# Patient Record
Sex: MALE | Race: OTHER | Employment: FULL TIME | URBAN - METROPOLITAN AREA
[De-identification: names, ages, dates, MRNs, and addresses within clinical notes are randomized per-mention and may not be internally consistent; named-entity substitution may affect disease eponyms.]

---

## 2019-04-15 ENCOUNTER — APPOINTMENT (OUTPATIENT)
Dept: CT IMAGING | Age: 39
DRG: 247 | End: 2019-04-15
Attending: PHYSICIAN ASSISTANT
Payer: COMMERCIAL

## 2019-04-15 ENCOUNTER — HOSPITAL ENCOUNTER (INPATIENT)
Age: 39
LOS: 2 days | Discharge: HOME | DRG: 247 | End: 2019-04-17
Attending: EMERGENCY MEDICINE | Admitting: INTERNAL MEDICINE
Payer: COMMERCIAL

## 2019-04-15 DIAGNOSIS — I21.4 NSTEMI (NON-ST ELEVATED MYOCARDIAL INFARCTION) (HCC): Primary | ICD-10-CM

## 2019-04-15 PROBLEM — D72.829 LEUKOCYTOSIS: Status: ACTIVE | Noted: 2019-04-15

## 2019-04-15 PROBLEM — Z72.0 TOBACCO ABUSE: Chronic | Status: ACTIVE | Noted: 2019-04-15

## 2019-04-15 PROBLEM — E78.00 HIGH CHOLESTEROL: Chronic | Status: ACTIVE | Noted: 2019-04-15

## 2019-04-15 LAB
ALBUMIN SERPL-MCNC: 4.3 G/DL (ref 3.5–5)
ALBUMIN/GLOB SERPL: 1.2 {RATIO} (ref 1.1–2.2)
ALP SERPL-CCNC: 62 U/L (ref 45–117)
ALT SERPL-CCNC: 57 U/L (ref 12–78)
ANION GAP SERPL CALC-SCNC: 6 MMOL/L (ref 5–15)
APTT PPP: 27.7 SEC (ref 22.1–32)
AST SERPL-CCNC: 53 U/L (ref 15–37)
BASOPHILS # BLD: 0.1 K/UL (ref 0–0.1)
BASOPHILS NFR BLD: 1 % (ref 0–1)
BILIRUB SERPL-MCNC: 0.4 MG/DL (ref 0.2–1)
BNP SERPL-MCNC: 363 PG/ML
BUN SERPL-MCNC: 10 MG/DL (ref 6–20)
BUN/CREAT SERPL: 10 (ref 12–20)
CALCIUM SERPL-MCNC: 8.8 MG/DL (ref 8.5–10.1)
CHLORIDE SERPL-SCNC: 103 MMOL/L (ref 97–108)
CO2 SERPL-SCNC: 28 MMOL/L (ref 21–32)
COMMENT, HOLDF: NORMAL
CREAT SERPL-MCNC: 1 MG/DL (ref 0.7–1.3)
DIFFERENTIAL METHOD BLD: ABNORMAL
EOSINOPHIL # BLD: 0.4 K/UL (ref 0–0.4)
EOSINOPHIL NFR BLD: 3 % (ref 0–7)
ERYTHROCYTE [DISTWIDTH] IN BLOOD BY AUTOMATED COUNT: 12.2 % (ref 11.5–14.5)
GLOBULIN SER CALC-MCNC: 3.5 G/DL (ref 2–4)
GLUCOSE SERPL-MCNC: 118 MG/DL (ref 65–100)
HCT VFR BLD AUTO: 46.8 % (ref 36.6–50.3)
HGB BLD-MCNC: 15.5 G/DL (ref 12.1–17)
IMM GRANULOCYTES # BLD AUTO: 0 K/UL (ref 0–0.04)
IMM GRANULOCYTES NFR BLD AUTO: 0 % (ref 0–0.5)
LYMPHOCYTES # BLD: 2.7 K/UL (ref 0.8–3.5)
LYMPHOCYTES NFR BLD: 21 % (ref 12–49)
MCH RBC QN AUTO: 30.7 PG (ref 26–34)
MCHC RBC AUTO-ENTMCNC: 33.1 G/DL (ref 30–36.5)
MCV RBC AUTO: 92.7 FL (ref 80–99)
MONOCYTES # BLD: 0.8 K/UL (ref 0–1)
MONOCYTES NFR BLD: 6 % (ref 5–13)
NEUTS SEG # BLD: 9 K/UL (ref 1.8–8)
NEUTS SEG NFR BLD: 69 % (ref 32–75)
NRBC # BLD: 0 K/UL (ref 0–0.01)
NRBC BLD-RTO: 0 PER 100 WBC
PLATELET # BLD AUTO: 276 K/UL (ref 150–400)
PMV BLD AUTO: 9.3 FL (ref 8.9–12.9)
POTASSIUM SERPL-SCNC: 3.6 MMOL/L (ref 3.5–5.1)
PROT SERPL-MCNC: 7.8 G/DL (ref 6.4–8.2)
RBC # BLD AUTO: 5.05 M/UL (ref 4.1–5.7)
SAMPLES BEING HELD,HOLD: NORMAL
SODIUM SERPL-SCNC: 137 MMOL/L (ref 136–145)
THERAPEUTIC RANGE,PTTT: NORMAL SECS (ref 58–77)
TROPONIN I BLD-MCNC: 1.97 NG/ML (ref 0–0.08)
WBC # BLD AUTO: 12.8 K/UL (ref 4.1–11.1)

## 2019-04-15 PROCEDURE — 84484 ASSAY OF TROPONIN QUANT: CPT

## 2019-04-15 PROCEDURE — 36415 COLL VENOUS BLD VENIPUNCTURE: CPT

## 2019-04-15 PROCEDURE — 83880 ASSAY OF NATRIURETIC PEPTIDE: CPT

## 2019-04-15 PROCEDURE — 82550 ASSAY OF CK (CPK): CPT

## 2019-04-15 PROCEDURE — 74011636320 HC RX REV CODE- 636/320: Performed by: EMERGENCY MEDICINE

## 2019-04-15 PROCEDURE — 85025 COMPLETE CBC W/AUTO DIFF WBC: CPT

## 2019-04-15 PROCEDURE — 93005 ELECTROCARDIOGRAM TRACING: CPT

## 2019-04-15 PROCEDURE — 71275 CT ANGIOGRAPHY CHEST: CPT

## 2019-04-15 PROCEDURE — 65610000006 HC RM INTENSIVE CARE

## 2019-04-15 PROCEDURE — 74011250636 HC RX REV CODE- 250/636: Performed by: PHYSICIAN ASSISTANT

## 2019-04-15 PROCEDURE — 80053 COMPREHEN METABOLIC PANEL: CPT

## 2019-04-15 PROCEDURE — 74011250637 HC RX REV CODE- 250/637: Performed by: INTERNAL MEDICINE

## 2019-04-15 PROCEDURE — 85730 THROMBOPLASTIN TIME PARTIAL: CPT

## 2019-04-15 PROCEDURE — 99285 EMERGENCY DEPT VISIT HI MDM: CPT

## 2019-04-15 RX ORDER — HYDROMORPHONE HYDROCHLORIDE 2 MG/ML
0.5 INJECTION, SOLUTION INTRAMUSCULAR; INTRAVENOUS; SUBCUTANEOUS
Status: DISCONTINUED | OUTPATIENT
Start: 2019-04-15 | End: 2019-04-17 | Stop reason: HOSPADM

## 2019-04-15 RX ORDER — PROCHLORPERAZINE EDISYLATE 5 MG/ML
10 INJECTION INTRAMUSCULAR; INTRAVENOUS
Status: DISCONTINUED | OUTPATIENT
Start: 2019-04-15 | End: 2019-04-17 | Stop reason: HOSPADM

## 2019-04-15 RX ORDER — ZOLPIDEM TARTRATE 5 MG/1
5 TABLET ORAL
Status: DISCONTINUED | OUTPATIENT
Start: 2019-04-15 | End: 2019-04-17 | Stop reason: HOSPADM

## 2019-04-15 RX ORDER — ACETAMINOPHEN 325 MG/1
325 TABLET ORAL
COMMUNITY
End: 2022-04-22

## 2019-04-15 RX ORDER — PRAVASTATIN SODIUM 20 MG/1
20 TABLET ORAL
Status: DISCONTINUED | OUTPATIENT
Start: 2019-04-16 | End: 2019-04-16

## 2019-04-15 RX ORDER — ACETAMINOPHEN 325 MG/1
650 TABLET ORAL
Status: DISCONTINUED | OUTPATIENT
Start: 2019-04-15 | End: 2019-04-17 | Stop reason: HOSPADM

## 2019-04-15 RX ORDER — SODIUM CHLORIDE 0.9 % (FLUSH) 0.9 %
5-40 SYRINGE (ML) INJECTION EVERY 8 HOURS
Status: DISCONTINUED | OUTPATIENT
Start: 2019-04-15 | End: 2019-04-17 | Stop reason: HOSPADM

## 2019-04-15 RX ORDER — LEVOTHYROXINE SODIUM 125 UG/1
150 TABLET ORAL
COMMUNITY
End: 2022-04-22 | Stop reason: SDUPTHER

## 2019-04-15 RX ORDER — LEVOTHYROXINE SODIUM 125 UG/1
125 TABLET ORAL
Status: DISCONTINUED | OUTPATIENT
Start: 2019-04-16 | End: 2019-04-17 | Stop reason: HOSPADM

## 2019-04-15 RX ORDER — SODIUM CHLORIDE 9 MG/ML
75 INJECTION, SOLUTION INTRAVENOUS CONTINUOUS
Status: DISCONTINUED | OUTPATIENT
Start: 2019-04-15 | End: 2019-04-16

## 2019-04-15 RX ORDER — PRAVASTATIN SODIUM 20 MG/1
20 TABLET ORAL
COMMUNITY
End: 2019-04-17

## 2019-04-15 RX ORDER — HEPARIN SODIUM 10000 [USP'U]/100ML
11-25 INJECTION, SOLUTION INTRAVENOUS
Status: DISCONTINUED | OUTPATIENT
Start: 2019-04-15 | End: 2019-04-16

## 2019-04-15 RX ORDER — OXYCODONE AND ACETAMINOPHEN 5; 325 MG/1; MG/1
1 TABLET ORAL
Status: DISCONTINUED | OUTPATIENT
Start: 2019-04-15 | End: 2019-04-17 | Stop reason: HOSPADM

## 2019-04-15 RX ORDER — SODIUM CHLORIDE 0.9 % (FLUSH) 0.9 %
5-40 SYRINGE (ML) INJECTION AS NEEDED
Status: DISCONTINUED | OUTPATIENT
Start: 2019-04-15 | End: 2019-04-17 | Stop reason: HOSPADM

## 2019-04-15 RX ORDER — HEPARIN SODIUM 5000 [USP'U]/ML
4000 INJECTION, SOLUTION INTRAVENOUS; SUBCUTANEOUS ONCE
Status: COMPLETED | OUTPATIENT
Start: 2019-04-15 | End: 2019-04-15

## 2019-04-15 RX ADMIN — ZOLPIDEM TARTRATE 5 MG: 5 TABLET ORAL at 23:19

## 2019-04-15 RX ADMIN — Medication 10 ML: at 23:21

## 2019-04-15 RX ADMIN — HEPARIN SODIUM 4000 UNITS: 5000 INJECTION INTRAVENOUS; SUBCUTANEOUS at 21:36

## 2019-04-15 RX ADMIN — HEPARIN SODIUM 11 UNITS/KG/HR: 10000 INJECTION, SOLUTION INTRAVENOUS at 21:39

## 2019-04-15 RX ADMIN — IOPAMIDOL 80 ML: 755 INJECTION, SOLUTION INTRAVENOUS at 19:39

## 2019-04-15 NOTE — Clinical Note
TRANSFER - OUT REPORT:  
 
Verbal report given to: olan. Report consisted of patient's Situation, Background, Assessment and  
Recommendations(SBAR). Opportunity for questions and clarification was provided. Patient transported with a Registered Nurse. TRANSFER - OUT REPORT: 
 
Verbal report given to ragini cates on Tim Zhong  being transferred to [facility] for routine progression of care Report consisted of patients Situation, Background, Assessment and  
Recommendations(SBAR). Information from the following report(s) Procedure Summary, Intake/Output, MAR and Recent Results was reviewed with the receiving nurse. Lines: @LDA(4,5,6,7,8,12)@ Opportunity for questions and clarification was provided. Patient transported with: 
 Monitor Registered Nurse

## 2019-04-15 NOTE — ED TRIAGE NOTES
Pt arrived via EMS from [facility] for CP that started yesterday after eating lunch. EKG WNL per EMS and [facility]. Worst pain was 8/10, 1 SL NTG given by [facility], pain improved down to a 4/10. Pt taken directly to Er 22 for EKG.

## 2019-04-15 NOTE — Clinical Note
Lesion: Located in the Proximal Diag 2. Stent deployed. Single technique used. First inflation pressure = 12 mandy; inflation time: 31 sec.

## 2019-04-15 NOTE — Clinical Note
TRANSFER - IN REPORT:  
 
Verbal report received from: bedside. Report consisted of patient's Situation, Background, Assessment and  
Recommendations(SBAR). Opportunity for questions and clarification was provided. Assessment completed upon patient's arrival to unit and care assumed. Patient transported with a Registered Nurse.

## 2019-04-15 NOTE — Clinical Note
Lesion located in the Proximal Diag 2. Balloon inserted. Balloon inflated using single inflation technique. Pressure = 8 mandy; Duration = 29 sec.

## 2019-04-15 NOTE — ED PROVIDER NOTES
Cherelle Patel is a 45 y.o. male  who presents by EMS to ER with c/o Patient presents with:  Chest Pain. Patient reports substernal chest pain that started yesterday after eating beans and chickpeas for lunch. Patient reports pain was originally intermittent and now is constant. Patient tried zantac with mild improvement. Patient seen at [facility] today and had elevated troponin and D dimer. Patient was sent to ED by EMS after getting sublingual nitro with some relief of pain. Patient denies any associated symptoms, denies any alleviating or aggravating symptoms. Denies shortness of breath. Patient reports history of hypothyroidism and hyperlipidemia. He specifically denies any fevers, chills, nausea, vomiting, shortness of breath, headache, rash, diarrhea, abdominal pain, urinary/bowel changes, sweating or weight loss. PCP: Jackie Villalpando MD   PMHx significant for: No past medical history on file. PSHx significant for: No past surgical history on file. Social Hx: Tobacco use: Social History    Tobacco Use      Smoking status: Not on file  ; EtOH use: The patient states he drinks 0 per week.; Illicit Drug use: Allergies:   -- Aspirin -- Hives    There are no other complaints, changes or physical findings at this time. No past medical history on file. No past surgical history on file. No family history on file.     Social History     Socioeconomic History    Marital status:      Spouse name: Not on file    Number of children: Not on file    Years of education: Not on file    Highest education level: Not on file   Occupational History    Not on file   Social Needs    Financial resource strain: Not on file    Food insecurity:     Worry: Not on file     Inability: Not on file    Transportation needs:     Medical: Not on file     Non-medical: Not on file   Tobacco Use    Smoking status: Not on file   Substance and Sexual Activity    Alcohol use: Not on file    Drug use: Not on file    Sexual activity: Not on file   Lifestyle    Physical activity:     Days per week: Not on file     Minutes per session: Not on file    Stress: Not on file   Relationships    Social connections:     Talks on phone: Not on file     Gets together: Not on file     Attends Cheondoism service: Not on file     Active member of club or organization: Not on file     Attends meetings of clubs or organizations: Not on file     Relationship status: Not on file    Intimate partner violence:     Fear of current or ex partner: Not on file     Emotionally abused: Not on file     Physically abused: Not on file     Forced sexual activity: Not on file   Other Topics Concern    Not on file   Social History Narrative    Not on file         ALLERGIES: Aspirin    Review of Systems   Constitutional: Negative for activity change, appetite change, chills and fever. HENT: Negative for congestion and sore throat. Respiratory: Negative for cough and shortness of breath. Cardiovascular: Positive for chest pain. Gastrointestinal: Negative for abdominal pain, diarrhea, nausea and vomiting. Genitourinary: Negative for dysuria. Musculoskeletal: Negative for arthralgias and myalgias. Skin: Negative for color change. Neurological: Negative for dizziness. Psychiatric/Behavioral: The patient is not nervous/anxious. All other systems reviewed and are negative. There were no vitals filed for this visit. Physical Exam   Constitutional: He is oriented to person, place, and time. He appears well-developed and well-nourished. HENT:   Head: Normocephalic and atraumatic. Right Ear: External ear normal.   Left Ear: External ear normal.   Mouth/Throat: Oropharynx is clear and moist. No oropharyngeal exudate. Eyes: Pupils are equal, round, and reactive to light. Conjunctivae and EOM are normal. Right eye exhibits no discharge. Left eye exhibits no discharge. No scleral icterus.    Neck: Normal range of motion. Neck supple. No tracheal deviation present. No thyromegaly present. Cardiovascular: Normal rate, regular rhythm, normal heart sounds and intact distal pulses. No murmur heard. Pulmonary/Chest: Effort normal and breath sounds normal. No respiratory distress. He has no wheezes. He has no rales. Abdominal: Soft. Bowel sounds are normal. He exhibits no distension. There is no tenderness. There is no rebound and no guarding. Musculoskeletal: Normal range of motion. He exhibits no edema or tenderness. Lymphadenopathy:     He has no cervical adenopathy. Neurological: He is alert and oriented to person, place, and time. No cranial nerve deficit. Coordination normal.   Skin: Skin is warm. No rash noted. No erythema. Psychiatric: He has a normal mood and affect. His behavior is normal. Judgment and thought content normal.   Nursing note and vitals reviewed. MDM       Procedures      CONSULT NOTE:   8:15 PM  Patricia Dakins PA-C spoke with Dr. Unique Baltazar,   Specialty: cardiology  Discussed pt's hx, disposition, and available diagnostic and imaging results. Reviewed care plans. Consultant agrees with plans as outlined. Recommended heparin and will do catheterization in the morning. CONSULT NOTE:   8:27 PM  Patricia Dakins PA-C spoke with Dr. Dipak Archibald,   Specialty: Hospitalist  Discussed pt's hx, disposition, and available diagnostic and imaging results. Reviewed care plans. Consultant agrees with plans as outlined. Will see for admission. Patient is being admitted to the hospital.  The results of their tests and reasons for their admission have been discussed with them and/or available family. They convey agreement and understanding for the need to be admitted and for their admission diagnosis. Consultation has been made with the inpatient physician specialist for hospitalization.     LABORATORY TESTS:  Recent Results (from the past 12 hour(s))   SAMPLES BEING HELD    Collection Time: 04/15/19  6:27 PM   Result Value Ref Range    SAMPLES BEING HELD red,blue,gold     COMMENT        Add-on orders for these samples will be processed based on acceptable specimen integrity and analyte stability, which may vary by analyte. CBC WITH AUTOMATED DIFF    Collection Time: 04/15/19  6:27 PM   Result Value Ref Range    WBC 12.8 (H) 4.1 - 11.1 K/uL    RBC 5.05 4. 10 - 5.70 M/uL    HGB 15.5 12.1 - 17.0 g/dL    HCT 46.8 36.6 - 50.3 %    MCV 92.7 80.0 - 99.0 FL    MCH 30.7 26.0 - 34.0 PG    MCHC 33.1 30.0 - 36.5 g/dL    RDW 12.2 11.5 - 14.5 %    PLATELET 270 749 - 680 K/uL    MPV 9.3 8.9 - 12.9 FL    NRBC 0.0 0  WBC    ABSOLUTE NRBC 0.00 0.00 - 0.01 K/uL    NEUTROPHILS 69 32 - 75 %    LYMPHOCYTES 21 12 - 49 %    MONOCYTES 6 5 - 13 %    EOSINOPHILS 3 0 - 7 %    BASOPHILS 1 0 - 1 %    IMMATURE GRANULOCYTES 0 0.0 - 0.5 %    ABS. NEUTROPHILS 9.0 (H) 1.8 - 8.0 K/UL    ABS. LYMPHOCYTES 2.7 0.8 - 3.5 K/UL    ABS. MONOCYTES 0.8 0.0 - 1.0 K/UL    ABS. EOSINOPHILS 0.4 0.0 - 0.4 K/UL    ABS. BASOPHILS 0.1 0.0 - 0.1 K/UL    ABS. IMM. GRANS. 0.0 0.00 - 0.04 K/UL    DF AUTOMATED     METABOLIC PANEL, COMPREHENSIVE    Collection Time: 04/15/19  6:27 PM   Result Value Ref Range    Sodium 137 136 - 145 mmol/L    Potassium 3.6 3.5 - 5.1 mmol/L    Chloride 103 97 - 108 mmol/L    CO2 28 21 - 32 mmol/L    Anion gap 6 5 - 15 mmol/L    Glucose 118 (H) 65 - 100 mg/dL    BUN 10 6 - 20 MG/DL    Creatinine 1.00 0.70 - 1.30 MG/DL    BUN/Creatinine ratio 10 (L) 12 - 20      GFR est AA >60 >60 ml/min/1.73m2    GFR est non-AA >60 >60 ml/min/1.73m2    Calcium 8.8 8.5 - 10.1 MG/DL    Bilirubin, total 0.4 0.2 - 1.0 MG/DL    ALT (SGPT) 57 12 - 78 U/L    AST (SGOT) 53 (H) 15 - 37 U/L    Alk.  phosphatase 62 45 - 117 U/L    Protein, total 7.8 6.4 - 8.2 g/dL    Albumin 4.3 3.5 - 5.0 g/dL    Globulin 3.5 2.0 - 4.0 g/dL    A-G Ratio 1.2 1.1 - 2.2     NT-PRO BNP    Collection Time: 04/15/19  6:27 PM   Result Value Ref Range    NT pro- (H) <125 PG/ML   POC TROPONIN-I    Collection Time: 04/15/19  6:32 PM   Result Value Ref Range    Troponin-I (POC) 1.97 (H) 0.00 - 0.08 ng/mL   PTT    Collection Time: 04/15/19  8:59 PM   Result Value Ref Range    aPTT 27.7 22.1 - 32.0 sec    aPTT, therapeutic range     58.0 - 77.0 SECS       IMAGING RESULTS:  See chart    MEDICATIONS GIVEN:  Medications   heparin 25,000 units in D5W 250 ml infusion (11 Units/kg/hr × 95.7 kg IntraVENous New Bag 4/15/19 2139)   0.9% sodium chloride infusion (has no administration in time range)   sodium chloride (NS) flush 5-40 mL (10 mL IntraVENous Given 4/15/19 2321)   sodium chloride (NS) flush 5-40 mL (has no administration in time range)   acetaminophen (TYLENOL) tablet 650 mg (has no administration in time range)   oxyCODONE-acetaminophen (PERCOCET) 5-325 mg per tablet 1 Tab (has no administration in time range)   HYDROmorphone (PF) (DILAUDID) injection 0.5 mg (has no administration in time range)   prochlorperazine (COMPAZINE) injection 10 mg (has no administration in time range)   zolpidem (AMBIEN) tablet 5 mg (5 mg Oral Given 4/15/19 2319)   levothyroxine (SYNTHROID) tablet 125 mcg (has no administration in time range)   pravastatin (PRAVACHOL) tablet 20 mg (has no administration in time range)   iopamidol (ISOVUE-370) 76 % injection 100 mL (80 mL IntraVENous Given 4/15/19 1939)   heparin (porcine) injection 4,000 Units (4,000 Units IntraVENous Given 4/15/19 2136)       IMPRESSION:  1. NSTEMI (non-ST elevated myocardial infarction) ([facility] [address]        PLAN:  1.  Admit to hospital

## 2019-04-15 NOTE — Clinical Note
Lesion located in the Proximal Diag 2. Balloon inflated using single inflation technique. Pressure = 10 mandy; Duration = 29 sec.

## 2019-04-15 NOTE — Clinical Note
Single view of the left main, left coronary artery, LAD and circumflex artery obtained using hand injection.

## 2019-04-16 ENCOUNTER — APPOINTMENT (OUTPATIENT)
Dept: NON INVASIVE DIAGNOSTICS | Age: 39
DRG: 247 | End: 2019-04-16
Attending: INTERNAL MEDICINE
Payer: COMMERCIAL

## 2019-04-16 ENCOUNTER — HOME HEALTH ADMISSION (OUTPATIENT)
Dept: HOME HEALTH SERVICES | Facility: HOME HEALTH | Age: 39
End: 2019-04-16

## 2019-04-16 LAB
ACT BLD: 373 SECS (ref 79–138)
ANION GAP SERPL CALC-SCNC: 6 MMOL/L (ref 5–15)
APTT PPP: 42.3 SEC (ref 22.1–32)
ATRIAL RATE: 61 BPM
ATRIAL RATE: 67 BPM
BUN SERPL-MCNC: 8 MG/DL (ref 6–20)
BUN/CREAT SERPL: 10 (ref 12–20)
CALCIUM SERPL-MCNC: 8.9 MG/DL (ref 8.5–10.1)
CALCULATED P AXIS, ECG09: 39 DEGREES
CALCULATED P AXIS, ECG09: 41 DEGREES
CALCULATED R AXIS, ECG10: 81 DEGREES
CALCULATED R AXIS, ECG10: 89 DEGREES
CALCULATED T AXIS, ECG11: 62 DEGREES
CALCULATED T AXIS, ECG11: 79 DEGREES
CHLORIDE SERPL-SCNC: 103 MMOL/L (ref 97–108)
CHOLEST SERPL-MCNC: 217 MG/DL
CK SERPL-CCNC: 338 U/L (ref 39–308)
CK SERPL-CCNC: 368 U/L (ref 39–308)
CO2 SERPL-SCNC: 25 MMOL/L (ref 21–32)
CREAT SERPL-MCNC: 0.84 MG/DL (ref 0.7–1.3)
DIAGNOSIS, 93000: NORMAL
DIAGNOSIS, 93000: NORMAL
ECHO AO ASC DIAM: 3.33 CM
ECHO AO ROOT DIAM: 3.75 CM
ECHO LA AREA 4C: 13.2 CM2
ECHO LA MAJOR AXIS: 3.76 CM
ECHO LA TO AORTIC ROOT RATIO: 1
ECHO LA VOL 2C: 36.67 ML (ref 18–58)
ECHO LA VOL 4C: 29.11 ML (ref 18–58)
ECHO LA VOL BP: 33.6 ML (ref 18–58)
ECHO LA VOL/BSA BIPLANE: 15.45 ML/M2 (ref 16–28)
ECHO LA VOLUME INDEX A2C: 16.86 ML/M2 (ref 16–28)
ECHO LA VOLUME INDEX A4C: 13.39 ML/M2 (ref 16–28)
ECHO LV E' LATERAL VELOCITY: 10.43 CENTIMETER/SECOND
ECHO LV E' SEPTAL VELOCITY: 6.92 CENTIMETER/SECOND
ECHO LV INTERNAL DIMENSION DIASTOLIC: 4.45 CM (ref 4.2–5.9)
ECHO LV INTERNAL DIMENSION SYSTOLIC: 2.84 CM
ECHO LV IVSD: 0.73 CM (ref 0.6–1)
ECHO LV MASS 2D: 111.1 G (ref 88–224)
ECHO LV MASS INDEX 2D: 45.8 G/M2 (ref 49–115)
ECHO LV POSTERIOR WALL DIASTOLIC: 0.74 CM (ref 0.6–1)
ECHO LVOT DIAM: 2.24 CM
ECHO MV A VELOCITY: 42.71 CM/S
ECHO MV AREA PHT: 3.6 CM2
ECHO MV E DECELERATION TIME (DT): 208 MS
ECHO MV E VELOCITY: 47.97 CM/S
ECHO MV E/A RATIO: 1.1
ECHO MV PRESSURE HALF TIME (PHT): 60.3 MS
ECHO RV INTERNAL DIMENSION: 2.84 CM
ECHO RV TAPSE: 1.72 CM (ref 1.5–2)
ERYTHROCYTE [DISTWIDTH] IN BLOOD BY AUTOMATED COUNT: 12.4 % (ref 11.5–14.5)
EST. AVERAGE GLUCOSE BLD GHB EST-MCNC: 137 MG/DL
GLUCOSE SERPL-MCNC: 198 MG/DL (ref 65–100)
HBA1C MFR BLD: 6.4 % (ref 4.2–6.3)
HCT VFR BLD AUTO: 47.8 % (ref 36.6–50.3)
HDLC SERPL-MCNC: 37 MG/DL
HDLC SERPL: 5.9 {RATIO} (ref 0–5)
HGB BLD-MCNC: 15.7 G/DL (ref 12.1–17)
LDLC SERPL CALC-MCNC: 131.6 MG/DL (ref 0–100)
LIPID PROFILE,FLP: ABNORMAL
MAGNESIUM SERPL-MCNC: 2.1 MG/DL (ref 1.6–2.4)
MCH RBC QN AUTO: 30 PG (ref 26–34)
MCHC RBC AUTO-ENTMCNC: 32.8 G/DL (ref 30–36.5)
MCV RBC AUTO: 91.4 FL (ref 80–99)
NRBC # BLD: 0 K/UL (ref 0–0.01)
NRBC BLD-RTO: 0 PER 100 WBC
P-R INTERVAL, ECG05: 142 MS
P-R INTERVAL, ECG05: 146 MS
PHOSPHATE SERPL-MCNC: 3.9 MG/DL (ref 2.6–4.7)
PLATELET # BLD AUTO: 281 K/UL (ref 150–400)
PMV BLD AUTO: 9.6 FL (ref 8.9–12.9)
POTASSIUM SERPL-SCNC: 3.8 MMOL/L (ref 3.5–5.1)
Q-T INTERVAL, ECG07: 404 MS
Q-T INTERVAL, ECG07: 422 MS
QRS DURATION, ECG06: 102 MS
QRS DURATION, ECG06: 106 MS
QTC CALCULATION (BEZET), ECG08: 406 MS
QTC CALCULATION (BEZET), ECG08: 445 MS
RBC # BLD AUTO: 5.23 M/UL (ref 4.1–5.7)
SODIUM SERPL-SCNC: 134 MMOL/L (ref 136–145)
THERAPEUTIC RANGE,PTTT: ABNORMAL SECS (ref 58–77)
TRIGL SERPL-MCNC: 242 MG/DL (ref ?–150)
TROPONIN I SERPL-MCNC: 10.8 NG/ML
TROPONIN I SERPL-MCNC: 9.47 NG/ML
TSH SERPL DL<=0.05 MIU/L-ACNC: 5.37 UIU/ML (ref 0.36–3.74)
VENTRICULAR RATE, ECG03: 61 BPM
VENTRICULAR RATE, ECG03: 67 BPM
VLDLC SERPL CALC-MCNC: 48.4 MG/DL
WBC # BLD AUTO: 10.9 K/UL (ref 4.1–11.1)

## 2019-04-16 PROCEDURE — 74011000250 HC RX REV CODE- 250: Performed by: INTERNAL MEDICINE

## 2019-04-16 PROCEDURE — 77030029065 HC DRSG HEMO QCLOT ZMED -B

## 2019-04-16 PROCEDURE — 84100 ASSAY OF PHOSPHORUS: CPT

## 2019-04-16 PROCEDURE — 77030019569 HC BND COMPR RAD TERU -B: Performed by: INTERNAL MEDICINE

## 2019-04-16 PROCEDURE — 80048 BASIC METABOLIC PNL TOTAL CA: CPT

## 2019-04-16 PROCEDURE — C1874 STENT, COATED/COV W/DEL SYS: HCPCS | Performed by: INTERNAL MEDICINE

## 2019-04-16 PROCEDURE — 93005 ELECTROCARDIOGRAM TRACING: CPT

## 2019-04-16 PROCEDURE — 74011250637 HC RX REV CODE- 250/637: Performed by: INTERNAL MEDICINE

## 2019-04-16 PROCEDURE — B2111ZZ FLUOROSCOPY OF MULTIPLE CORONARY ARTERIES USING LOW OSMOLAR CONTRAST: ICD-10-PCS | Performed by: INTERNAL MEDICINE

## 2019-04-16 PROCEDURE — 74011636320 HC RX REV CODE- 636/320: Performed by: INTERNAL MEDICINE

## 2019-04-16 PROCEDURE — C1725 CATH, TRANSLUMIN NON-LASER: HCPCS | Performed by: INTERNAL MEDICINE

## 2019-04-16 PROCEDURE — 85027 COMPLETE CBC AUTOMATED: CPT

## 2019-04-16 PROCEDURE — 84443 ASSAY THYROID STIM HORMONE: CPT

## 2019-04-16 PROCEDURE — 74011250636 HC RX REV CODE- 250/636: Performed by: INTERNAL MEDICINE

## 2019-04-16 PROCEDURE — C1769 GUIDE WIRE: HCPCS | Performed by: INTERNAL MEDICINE

## 2019-04-16 PROCEDURE — C1894 INTRO/SHEATH, NON-LASER: HCPCS | Performed by: INTERNAL MEDICINE

## 2019-04-16 PROCEDURE — 83036 HEMOGLOBIN GLYCOSYLATED A1C: CPT

## 2019-04-16 PROCEDURE — 74011250636 HC RX REV CODE- 250/636

## 2019-04-16 PROCEDURE — 65660000000 HC RM CCU STEPDOWN

## 2019-04-16 PROCEDURE — 99153 MOD SED SAME PHYS/QHP EA: CPT | Performed by: INTERNAL MEDICINE

## 2019-04-16 PROCEDURE — 77030008543 HC TBNG MON PRSS MRTM -A: Performed by: INTERNAL MEDICINE

## 2019-04-16 PROCEDURE — 74011000258 HC RX REV CODE- 258: Performed by: INTERNAL MEDICINE

## 2019-04-16 PROCEDURE — 93458 L HRT ARTERY/VENTRICLE ANGIO: CPT | Performed by: INTERNAL MEDICINE

## 2019-04-16 PROCEDURE — 77030004532 HC CATH ANGI DX IMP BSC -A: Performed by: INTERNAL MEDICINE

## 2019-04-16 PROCEDURE — C8929 TTE W OR WO FOL WCON,DOPPLER: HCPCS

## 2019-04-16 PROCEDURE — 80061 LIPID PANEL: CPT

## 2019-04-16 PROCEDURE — 74011250637 HC RX REV CODE- 250/637: Performed by: NURSE PRACTITIONER

## 2019-04-16 PROCEDURE — C1887 CATHETER, GUIDING: HCPCS | Performed by: INTERNAL MEDICINE

## 2019-04-16 PROCEDURE — 77030013519 HC DEV INFL BASIX MRTM -B: Performed by: INTERNAL MEDICINE

## 2019-04-16 PROCEDURE — 92928 PRQ TCAT PLMT NTRAC ST 1 LES: CPT | Performed by: INTERNAL MEDICINE

## 2019-04-16 PROCEDURE — 77030018842 HC SOL IRR SOD CL 9% BAXT -A: Performed by: INTERNAL MEDICINE

## 2019-04-16 PROCEDURE — 85730 THROMBOPLASTIN TIME PARTIAL: CPT

## 2019-04-16 PROCEDURE — 85347 COAGULATION TIME ACTIVATED: CPT

## 2019-04-16 PROCEDURE — 027034Z DILATION OF CORONARY ARTERY, ONE ARTERY WITH DRUG-ELUTING INTRALUMINAL DEVICE, PERCUTANEOUS APPROACH: ICD-10-PCS | Performed by: INTERNAL MEDICINE

## 2019-04-16 PROCEDURE — 99152 MOD SED SAME PHYS/QHP 5/>YRS: CPT | Performed by: INTERNAL MEDICINE

## 2019-04-16 PROCEDURE — 83735 ASSAY OF MAGNESIUM: CPT

## 2019-04-16 PROCEDURE — 4A023N7 MEASUREMENT OF CARDIAC SAMPLING AND PRESSURE, LEFT HEART, PERCUTANEOUS APPROACH: ICD-10-PCS | Performed by: INTERNAL MEDICINE

## 2019-04-16 PROCEDURE — 77030004522 HC CATH ANGI DX EXPO BSC -A: Performed by: INTERNAL MEDICINE

## 2019-04-16 DEVICE — STENT RONYX20015UX RESOLUTE ONYX 2.00X15
Type: IMPLANTABLE DEVICE | Status: FUNCTIONAL
Brand: RESOLUTE ONYX™

## 2019-04-16 RX ORDER — SODIUM CHLORIDE 0.9 % (FLUSH) 0.9 %
5-40 SYRINGE (ML) INJECTION AS NEEDED
Status: DISCONTINUED | OUTPATIENT
Start: 2019-04-16 | End: 2019-04-17 | Stop reason: HOSPADM

## 2019-04-16 RX ORDER — FENTANYL CITRATE 50 UG/ML
INJECTION, SOLUTION INTRAMUSCULAR; INTRAVENOUS AS NEEDED
Status: DISCONTINUED | OUTPATIENT
Start: 2019-04-16 | End: 2019-04-16 | Stop reason: HOSPADM

## 2019-04-16 RX ORDER — HEPARIN SODIUM 200 [USP'U]/100ML
INJECTION, SOLUTION INTRAVENOUS
Status: DISCONTINUED | OUTPATIENT
Start: 2019-04-16 | End: 2019-04-16 | Stop reason: HOSPADM

## 2019-04-16 RX ORDER — HEPARIN SODIUM 1000 [USP'U]/ML
INJECTION, SOLUTION INTRAVENOUS; SUBCUTANEOUS AS NEEDED
Status: DISCONTINUED | OUTPATIENT
Start: 2019-04-16 | End: 2019-04-16 | Stop reason: HOSPADM

## 2019-04-16 RX ORDER — ATORVASTATIN CALCIUM 20 MG/1
40 TABLET, FILM COATED ORAL
Status: DISCONTINUED | OUTPATIENT
Start: 2019-04-16 | End: 2019-04-17 | Stop reason: HOSPADM

## 2019-04-16 RX ORDER — METOPROLOL TARTRATE 25 MG/1
25 TABLET, FILM COATED ORAL EVERY 12 HOURS
Status: DISCONTINUED | OUTPATIENT
Start: 2019-04-16 | End: 2019-04-17 | Stop reason: HOSPADM

## 2019-04-16 RX ORDER — MIDAZOLAM HYDROCHLORIDE 1 MG/ML
INJECTION, SOLUTION INTRAMUSCULAR; INTRAVENOUS AS NEEDED
Status: DISCONTINUED | OUTPATIENT
Start: 2019-04-16 | End: 2019-04-16 | Stop reason: HOSPADM

## 2019-04-16 RX ORDER — SODIUM CHLORIDE 0.9 % (FLUSH) 0.9 %
5-40 SYRINGE (ML) INJECTION EVERY 8 HOURS
Status: DISCONTINUED | OUTPATIENT
Start: 2019-04-16 | End: 2019-04-17 | Stop reason: HOSPADM

## 2019-04-16 RX ORDER — LIDOCAINE HYDROCHLORIDE 10 MG/ML
INJECTION INFILTRATION; PERINEURAL AS NEEDED
Status: DISCONTINUED | OUTPATIENT
Start: 2019-04-16 | End: 2019-04-16 | Stop reason: HOSPADM

## 2019-04-16 RX ORDER — SODIUM CHLORIDE 9 MG/ML
75 INJECTION, SOLUTION INTRAVENOUS CONTINUOUS
Status: DISPENSED | OUTPATIENT
Start: 2019-04-16 | End: 2019-04-17

## 2019-04-16 RX ORDER — HEPARIN SODIUM 5000 [USP'U]/ML
2000 INJECTION, SOLUTION INTRAVENOUS; SUBCUTANEOUS ONCE
Status: COMPLETED | OUTPATIENT
Start: 2019-04-16 | End: 2019-04-16

## 2019-04-16 RX ORDER — VERAPAMIL HYDROCHLORIDE 2.5 MG/ML
INJECTION, SOLUTION INTRAVENOUS AS NEEDED
Status: DISCONTINUED | OUTPATIENT
Start: 2019-04-16 | End: 2019-04-16 | Stop reason: HOSPADM

## 2019-04-16 RX ADMIN — ACETAMINOPHEN 650 MG: 325 TABLET ORAL at 14:48

## 2019-04-16 RX ADMIN — Medication 10 ML: at 05:45

## 2019-04-16 RX ADMIN — HEPARIN SODIUM 13 UNITS/KG/HR: 10000 INJECTION, SOLUTION INTRAVENOUS at 03:59

## 2019-04-16 RX ADMIN — TICAGRELOR 90 MG: 90 TABLET ORAL at 22:15

## 2019-04-16 RX ADMIN — METOPROLOL TARTRATE 25 MG: 25 TABLET ORAL at 18:02

## 2019-04-16 RX ADMIN — Medication 10 ML: at 13:36

## 2019-04-16 RX ADMIN — LEVOTHYROXINE SODIUM 125 MCG: 125 TABLET ORAL at 07:21

## 2019-04-16 RX ADMIN — SODIUM CHLORIDE 75 ML/HR: 900 INJECTION, SOLUTION INTRAVENOUS at 05:44

## 2019-04-16 RX ADMIN — Medication 10 ML: at 22:17

## 2019-04-16 RX ADMIN — SODIUM CHLORIDE 75 ML/HR: 900 INJECTION, SOLUTION INTRAVENOUS at 20:28

## 2019-04-16 RX ADMIN — HEPARIN SODIUM 2000 UNITS: 5000 INJECTION INTRAVENOUS; SUBCUTANEOUS at 03:58

## 2019-04-16 RX ADMIN — Medication 10 ML: at 22:16

## 2019-04-16 RX ADMIN — PERFLUTREN 2 ML: 6.52 INJECTION, SUSPENSION INTRAVENOUS at 08:49

## 2019-04-16 RX ADMIN — ATORVASTATIN CALCIUM 40 MG: 20 TABLET, FILM COATED ORAL at 22:16

## 2019-04-16 NOTE — H&P
[address]  [phone]    Admission History and Physical      NAME:  Rayne Jade   :   1980   MRN:  [id number]     PCP:  Jolie Giraldo MD     Date/Time:  4/15/2019         Subjective:     CHIEF COMPLAINT: chest pain     HISTORY OF PRESENT ILLNESS:     Mr. Catalino Bright is a 7[address] [facility] y.o  male who is admitted with NSTEMI. Mr. Catalino Bright presented to the Emergency Department this PM complaining of chest pain: since yesterday, intermittent, epigastric/substernal, pressure-like, somewhat worse with exertion but occurrening at rest, waxing and waning, recurred this afternoon    History obtained from mother and friends and the patient. Previous records reviewed, visit to Urgent Care for headache, routine blood work was normal    Past Medical History:   Diagnosis Date    High cholesterol     Tobacco abuse 4/15/2019        No past surgical history on file. Social History     Tobacco Use    Smoking status: Current Every Day Smoker   Substance Use Topics    Alcohol use: Not on file        Family History   Problem Relation Age of Onset    High Cholesterol Mother     Hypertension Mother     Diabetes Other         Allergies   Allergen Reactions    Aspirin Hives        Prior to Admission medications    Medication Sig Start Date End Date Taking? Authorizing Provider   levothyroxine (SYNTHROID) 125 mcg tablet Take 125 mcg by mouth Daily (before breakfast). Yes Provider, Historical   pravastatin (PRAVACHOL) 20 mg tablet Take 20 mg by mouth daily (with lunch). Yes Provider, Historical   acetaminophen (TYLENOL) 325 mg tablet Take 325 mg by mouth every six (6) hours as needed for Pain.    Yes Provider, Historical         Review of Systems:  (bold if positive, if negative)    Gen:  Eyes:  ENT:  CVS:  chest pain,Pulm:  GI:  GERD  :    MS:  Skin:  Psych:  Endo:    Hem:  Renal:    Neuro:            Objective:      VITALS:    Vital signs reviewed; most recent are:    Visit Vitals  /82   Pulse 71   Resp 15   Ht 5' 11\" (1.803 m)   Wt 95.7 kg (211 lb)   SpO2 94%   BMI 29.43 kg/m²     SpO2 Readings from Last 6 Encounters:   04/15/19 94%        No intake or output data in the 24 hours ending 04/15/19 2105         Exam:     Physical Exam:    Gen: Well-developed, overweight, in no acute distress  HEENT:  Pink conjunctivae, PERRL, hearing intact to voice, moist mucous membranes  Neck: Supple, without masses, thyroid non-tender  Resp: No accessory muscle use, clear breath sounds without wheezes rales or rhonchi  Card: No murmurs, normal S1, S2 without thrills, bruits or peripheral edema, 2+ LE peripheral pulses  Abd:  Soft, non-tender, non-distended, normoactive bowel sounds are present, no palpable organomegaly and no detectable hernias  Lymph:  No cervical or inguinal adenopathy  Musc: No cyanosis or clubbing  Skin: No rashes or ulcers, skin turgor is good, cap refill <2 sec  Neuro:  Cranial nerves are grossly intact, no focal motor weakness, follows commands appropriately  Psych:  Good insight, oriented to person, place and time, alert             Labs:    Recent Labs     04/15/19  1827   WBC 12.8*   HGB 15.5   HCT 46.8        Recent Labs     04/15/19  1827      K 3.6      CO2 28   *   BUN 10   CREA 1.00   CA 8.8   ALB 4.3   TBILI 0.4   SGOT 53*   ALT 57     No results found for: GLUCPOC  No results for input(s): PH, PCO2, PO2, HCO3, FIO2 in the last 72 hours. No results for input(s): INR in the last 72 hours.     No lab exists for component: INREXT    Additional testing:  Chest CT without PE or dissection, visualized by me        Assessment/Plan:       Principal Problem:    NSTEMI (non-ST elevated myocardial infarction) ([facility] [address] (4/15/2019)   - highly elevated troponinin c/w AMI, no ST elevation on EKG   - anticoagulate as started in ED   - echocardiogram in AM to evaluate for WMA and EF   - Cardiology consult, NPO after MN for probable cardiac cath tomorrow    Active Problems:    High cholesterol (4/15/2019)   - continue statin, check lipid profile      Tobacco abuse (4/15/2019)   - counseled on cessation, spent 5 minutes (outside of the time documented for this note) solely focused on tobacco cessation counseling    - no nicotine patch for now      Leukocytosis (4/15/2019)   - no apparent source of infection, may be elevated due to acute stress   - follow without antibiotics       Code status:   - patient is FULL CODE, no AMD on file, his mother is his surrogate      Total time spent on patient care: [address] discussed with: Patient, Family, Nursing Staff and ALEENA Arriaga    Discussed:  Code Status, Care Plan and D/C Planning    Prophylaxis:  IV heparin    Probable Disposition:  Home w/Family           ___________________________________________________    Attending Physician: Rita Alba MD

## 2019-04-16 NOTE — PROGRESS NOTES
Reason for Admission:   NSTEMI                   RRAT Score:          3           Plan for utilizing home health:   A referral has been made to [facility] for hospital to home MI program                       Current Advanced Directive/Advance Care Plan:full code, no AMD on file    Likelihood of Readmission: low/green                          Transition of Care Plan:             I was notified by the UR nurse that pt is a [employer] pt so if pt stays longer than tomorrow,he will need to transfer to a [facility] facility. I received the order for pt for [facility] MI visit which was sent to [facility] through the cc link. I am waiting to see if the home health agency can accept pt as he is a [employer] pt. I contacted pt.'s pharmacy to see how much pt.'s co-pay will be. With insurance,his co-pay will be 75 dollars/month. I also gave pt the coupon to reduce the costs of pt,'s medications to 5 dollars/month. Pt has a follow-up visit with Dr Leland Chang on 4/22/19 @ 1:40 pm.  PCP is Dr Mary Fernandes are no nurse navigators to notify.     Grace Leiva

## 2019-04-16 NOTE — CARDIO/PULMONARY
Cardiac Rehab: 46 yo male admitted with Chest Pain (4/15). Per Dr Vaibhav Reyes , dx includes: NSTEMI. S/P PTCA with CRISTINO to LAD (4/17). LVEF 55-60% by echo (4/16/19). .    Core Measures:  ACE/ARB - none  BB - started on metoprolol  Statin - pravastatin changed to atorvastatin  ASA - none (allergic to aspirin)  New PO Cardiac Medications: Brilinta, metoprolol, and atorvastatin. Smoking History: Current smoker. Started smoking at age 24 and smokes up to 1ppd. 4/16/2019 Met with Levar Silva on [facility]CU. His significant other (Lucinda) was also present. 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 Pt's brother is an ophthalmologist and was on telephone during this session. Pt would like results of cath shared with his brother. Pt indicated awareness that he had a heart attack and cath is plan this morning. Provided MI education folder. Pt also aware that smoking cessation is recommended. Briefly discussed benefits of outpatient cardiac rehab program. Pt verbalized understanding. All questions were answered. 4/17/19 Pt was discharged following PCI. 4/18/2019 Spoke to patient regarding Cardiac Rehab. Pt indicated he was not sure when he would be able to get work coverage so that he could participate in the program. Has f/u appt with cardiologist on 4/22. Pt indicated that he would like to be called back on Wed, 4/24. Cell phone [phone].

## 2019-04-16 NOTE — PROGRESS NOTES
Drew Ingram [facility] [address]  [address]  [phone]      Medical Progress Note      NAME: Jon Lee   :  1980  MRM:  [id number]    Date/Time: 2019  1:18 PM       Assessment and Plan:   1. NSTEMI (non-ST elevated myocardial infarction) ([address] (4/15/2019). S/p cardiac cath and stent. Echocardiogram is normal. Started on brilinta and metoprolol. 2.  High cholesterol (4/15/2019). On statin     3. Leukocytosis (4/15/2019). Likely due to acute stress. Now resolved. 4.  Tobacco abuse (4/15/2019). Counseled on cessation. Subjective:     Chief Complaint:  Follow up of pt who was admitted with NSTEMI    ROS:  (bold if positive, if negative)      Tolerating PT  Tolerating Diet        Objective:     Last 24hrs VS reviewed since prior progress note.  Most recent are:    Visit Vitals  /85   Pulse 75   Temp 97.6 °F (36.4 °C)   Resp 15   Ht 5' 11\" (1.803 m)   Wt 97.5 kg (215 lb)   SpO2 97%   BMI 29.99 kg/m²     SpO2 Readings from Last 6 Encounters:   19 97%            Intake/Output Summary (Last 24 hours) at 2019 1318  Last data filed at 2019 1226  Gross per 24 hour   Intake 2883.12 ml   Output 1000 ml   Net 1883.12 ml        Physical Exam:    Gen:  Well-developed, well-nourished, in no acute distress  HEENT:  Pink conjunctivae, PERRL, hearing intact to voice, moist mucous membranes  Neck:  Supple, without masses, thyroid non-tender  Resp:  No accessory muscle use, clear breath sounds without wheezes rales or rhonchi  Card:  No murmurs, normal S1, S2 without thrills, bruits or peripheral edema  Abd:  Soft, non-tender, non-distended, normoactive bowel sounds are present, no palpable organomegaly and no detectable hernias  Lymph:  No cervical or inguinal adenopathy  Musc:  No cyanosis or clubbing  Skin:  No rashes or ulcers, skin turgor is good  Neuro:  Cranial nerves are grossly intact, no focal motor weakness, follows commands appropriately  Psych:  Good insight, oriented to person, place and time, alert  __________________________________________________________________  Medications Reviewed: (see below)  Medications:     Current Facility-Administered Medications   Medication Dose Route Frequency    atorvastatin (LIPITOR) tablet 40 mg  40 mg Oral QHS    sodium chloride (NS) flush 5-40 mL  5-40 mL IntraVENous Q8H    sodium chloride (NS) flush 5-40 mL  5-40 mL IntraVENous PRN    0.9% sodium chloride infusion  75 mL/hr IntraVENous CONTINUOUS    ticagrelor (BRILINTA) tablet 90 mg  90 mg Oral Q12H    metoprolol tartrate (LOPRESSOR) tablet 25 mg  25 mg Oral Q12H    sodium chloride (NS) flush 5-40 mL  5-40 mL IntraVENous Q8H    sodium chloride (NS) flush 5-40 mL  5-40 mL IntraVENous PRN    acetaminophen (TYLENOL) tablet 650 mg  650 mg Oral Q4H PRN    oxyCODONE-acetaminophen (PERCOCET) 5-325 mg per tablet 1 Tab  1 Tab Oral Q4H PRN    HYDROmorphone (PF) (DILAUDID) injection 0.5 mg  0.5 mg IntraVENous Q4H PRN    prochlorperazine (COMPAZINE) injection 10 mg  10 mg IntraVENous Q6H PRN    zolpidem (AMBIEN) tablet 5 mg  5 mg Oral QHS PRN    levothyroxine (SYNTHROID) tablet 125 mcg  125 mcg Oral ACB        Lab Data Reviewed: (see below)  Lab Review:     Recent Labs     04/16/19  0539 04/15/19  1827   WBC 10.9 12.8*   HGB 15.7 15.5   HCT 47.8 46.8    276     Recent Labs     04/16/19  0539 04/15/19  1827   * 137   K 3.8 3.6    103   CO2 25 28   * 118*   BUN 8 10   CREA 0.84 1.00   CA 8.9 8.8   MG 2.1  --    PHOS 3.9  --    ALB  --  4.3   TBILI  --  0.4   SGOT  --  53*   ALT  --  57     No results found for: GLUCPOC  No results for input(s): PH, PCO2, PO2, HCO3, FIO2 in the last 72 hours. No results for input(s): INR in the last 72 hours. No lab exists for component: INREXT  All Micro Results     None          I have reviewed notes of prior 24hr. Other pertinent lab:       Total time spent with patient: [address] discussed with: Patient, Nursing Staff and >50% of time spent in counseling and coordination of care    Discussed:  Care Plan    Prophylaxis:  Lovenox    Disposition:  Home w/Family           ___________________________________________________    Attending Physician: Raegan Gilliam MD

## 2019-04-16 NOTE — PROGRESS NOTES
2125 TRANSFER - IN REPORT:  Verbal report received from [facility]  on Isa Storey  being received from ER  for routine progression of care    Report consisted of patients Situation, Background, Assessment and   Recommendations(SBAR). Information from the following report(s) SBAR, Kardex, ED Summary, MAR, Recent Results, Med Rec Status and Cardiac Rhythm normal sinus was reviewed with the receiving nurse. Opportunity for questions and clarification was provided. Assessment completed upon patients arrival to unit and care assumed. Primary Nurse Gisela Rhodes RN and Leonor Pat RN performed a dual skin assessment on this patient No impairment noted  Tylor score is 23  Patient is ambulatory to bed, no deficits noted, patient denies any pain at this time  2139 Heparin gtt started at this time, new IV site in left forearm  2335 Labs drawn for serial troponin at this time. Patient also request Vikram Deutscher to help him sleep tonight given also  0000 Reassessment done at this time, no changes noted, patient continues on heparin gtt at this time with no complications noted. Patient verbalizes understanding that he is NPO at this time till am, til Cardiology comes and evaluates him for possible cath tomorrow  0040 Repeat troponin high at 10.8 , Dr Dane Collins aware no new orders received. 0235 PTT drawn at this time and sent to lab at this time  0400 Reassessment done at this time, heparin gtt increased by 2 units at this time and heparin bolus given at this time. Patient denies any pain or discomfort at this time   36 New IV started and IV fluids started and blood drawn for am labs and serial troponin   0700 Bedside and Verbal shift change report given to  [employer]  (oncoming nurse) by Wes Faust RN  (offgoing nurse).  Report included the following information SBAR, Kardex, Intake/Output, MAR, Accordion, Recent Results, Med Rec Status, Cardiac Rhythm normal sinus, Alarm Parameters  and Pre Procedure Checklist.

## 2019-04-16 NOTE — PROGRESS NOTES
[facility]: MED RECONCILIATION    Comments/Recommendations:   Med rec performed via interview with patient who was a good historian. Medications added:     Pravastatin  levothyroxine    Information obtained from: patient    Significant PMH/Disease States:   Past Medical History:   Diagnosis Date    High cholesterol     Tobacco abuse 4/15/2019       Chief Complaint for this Admission:   Chief Complaint   Patient presents with    Chest Pain       Allergies: Aspirin    Prior to Admission Medications:   Prior to Admission Medications   Prescriptions Last Dose Informant Patient Reported? Taking?   acetaminophen (TYLENOL) 325 mg tablet   Yes Yes   Sig: Take 325 mg by mouth every six (6) hours as needed for Pain.   levothyroxine (SYNTHROID) 125 mcg tablet 4/15/2019 at AM Self Yes Yes   Sig: Take 125 mcg by mouth Daily (before breakfast). pravastatin (PRAVACHOL) 20 mg tablet 4/14/2019 at PM Self Yes Yes   Sig: Take 20 mg by mouth daily (with lunch).       Facility-Administered Medications: None                    ENRIQUETA Hannon   Contact: [phone]

## 2019-04-16 NOTE — CONSULTS
Aj Cardona MD [facility] [address]  Office [phone]      Date of  Admission: 4/15/2019  6:16 PM       Assessment/Plan:   · NSTEMI: troponin peak 10. ACS heparin initiated in the ED. Ck lipids, start high potency statin, BB. ASA allergy. Refer to cardiac rehab/CM for [facility] visit post discharge. Reviewed need for cath. Pt agreeable The risks (including but not limited to death, myocardial infarction, cerebrovascular accident, dysrhythmia, renal failure, vascular complication, allergy, and/or need for emergency surgery), benefits, and alternatives have been explained. Verbal informed consent has been obtained. · Elevated glucose: ck A1C  · Smoker: discussed smoking cessation. Reviewed nicoderm patch, declines at present. · HLD: lipid panel pending. · Hypothyroidism: cont synthroid, ck TSH. ·    Pt personally seen and examined. Chart reviewed. Agree with advanced NP's history, exam and  A/P with changes/additons. Neck-no JVD/difficult to appreciate JVD  CVS-S1-S2 present, no murmur   RS-   CTAB        Abdomen-  soft/NT  LE-   no edema    ECHO - prelim - EF 55%    NSTEMI - cardiac cath today    ASA allergy - hives - will need densensitization/referral to allergist  Will start Brilinta  LHC +/- PCI/RHC consent    The risks (including but not limited to death, myocardial infarction, cerebrovascular accident, dysrhythmia, renal failure +/-dialysis, vascular complication, allergy, and/or need for emergency surgery), indications, benefits, and alternatives of cardiac catheterization +/- PCI have been explained in detail to the patient and family. Patient and family informed that if patient needs surgery  - it will be at [facility] as [facility] does not have onsite surgery. All questions answered to patient's satisfaction. Patient agrees to proceed with the procedure and  informed consent was obtained.     Patient evaluated and is a CRISTINO candidate. Henri's R - normal    ASA 3    AW 2    Discussed with patient/nursing/family          Thank you for allowing us to participate in [facility] care. We will be happy to follow along. Please call for any questions. Surjit Boucher MD, Tawana Godinez requested by Iain Peralta MD for *NSTEMI (non-ST elevated myocardial infarction) ([facility] [address] [I21.4]    Subjective:  [facility] is a 45 y.o.male  with PMH significant for HLD, tobacco use who presented to the ED with chest pain . Patient reports substernal chest pain that started yesterday after eating beans and chickpeas for lunch. Patient reports pain was originally intermittent and now is constant. Patient tried zantac with mild improvement. Patient seen at [facility] today and had elevated troponin and D dimer. Patient was sent to ED by EMS after getting sublingual nitro with some relief of pain. Patient denies any associated symptoms, denies any alleviating or aggravating symptoms. Denies shortness of breath. Patient reports history of hypothyroidism and hyperlipidemia. No family hx CAD.        The patient denies chest pain, dependent edema, diaphoresis, SORENSON, orthopnea, palpitations, PND, shortness of breath, claudication or syncope. No bleeding. Cardiac risk factors    Smoker  HLD      LABS:   Troponin: peak 10.8   CTA: no PE, no disection     Cardiographics:   Telemetry: SR   ECG: NSR incomplete RBBB    Cardiac Testing/ Procedures: A. Cardiac Cath/PCI: pending  B.ECHO/JOSLYN:   C.StressNuclear/Stress ECHO/Stress test:   D.Vascular:   E. EP:   F. Miscellaneous    SOCIAL HX: single, gas station owner.  Smoker   FAMILY HX:  No CAD      Patient Active Problem List    Diagnosis Date Noted    NSTEMI (non-ST elevated myocardial infarction) ([facility] [address] 04/15/2019    High cholesterol 04/15/2019    Tobacco abuse 04/15/2019    Leukocytosis 04/15/2019      Past Medical History:   Diagnosis Date    High cholesterol     Tobacco abuse 4/15/2019 No past surgical history on file. Allergies   Allergen Reactions    Aspirin Hives      Current Facility-Administered Medications   Medication Dose Route Frequency    heparin 25,000 units in D5W 250 ml infusion  11-25 Units/kg/hr IntraVENous TITRATE    0.9% sodium chloride infusion  75 mL/hr IntraVENous CONTINUOUS    sodium chloride (NS) flush 5-40 mL  5-40 mL IntraVENous Q8H    sodium chloride (NS) flush 5-40 mL  5-40 mL IntraVENous PRN    acetaminophen (TYLENOL) tablet 650 mg  650 mg Oral Q4H PRN    oxyCODONE-acetaminophen (PERCOCET) 5-325 mg per tablet 1 Tab  1 Tab Oral Q4H PRN    HYDROmorphone (PF) (DILAUDID) injection 0.5 mg  0.5 mg IntraVENous Q4H PRN    prochlorperazine (COMPAZINE) injection 10 mg  10 mg IntraVENous Q6H PRN    zolpidem (AMBIEN) tablet 5 mg  5 mg Oral QHS PRN    levothyroxine (SYNTHROID) tablet 125 mcg  125 mcg Oral ACB    pravastatin (PRAVACHOL) tablet 20 mg  20 mg Oral DAILY WITH LUNCH          Review of Symptoms:  Constitutional: positive for fatigue  ENT: negative for hearing loss   Respiratory: negative for cough or dyspnea on exertion  Gastrointestinal: positive for dyspepsia  Genitourinary: no dysuria, hematuria, frequency   Musculoskeletal:negative for back pain  Neurological: negative for headaches and weakness  Other systems reviewed and negative except as above. Social History     Socioeconomic History    Marital status:      Spouse name: Not on file    Number of children: Not on file    Years of education: Not on file    Highest education level: Not on file   Tobacco Use    Smoking status: Current Every Day Smoker     Family History   Problem Relation Age of Onset    High Cholesterol Mother     Hypertension Mother     Diabetes Other          Physical Exam    Visit Vitals  /88   Pulse 78   Temp 97.8 °F (36.6 °C)   Resp 18   Ht 5' 11\" (1.803 m)   Wt 215 lb 9.8 oz (97.8 kg)   SpO2 93%   BMI 30.07 kg/m²     General: awake, alert, and oriented. MAEx4. No acute distress   HEENT Exam:     Normocephalic, atraumatic. Sclera anicteric . Neck: supple  Lung Exam:     Not  dyspneic. Respirations unlabored. O2 @ 93% room air Sat: . Lungs: No wheezes, crackles, rhonchi, or rubs heard on auscultation. Heart Exam:       PMI nondisplaced,  Rate: Rhythm: regular, no murmur    No JVD, brisk carotid upstroke, no carotid bruits. Abdomen Exam:      obese, soft, nontender. + Bowel sounds. No palpable masses. : voiding     Extremities Exam:      Atraumatic. No edema. Vascular Exam:      radial, brachial, dorsalis pedis, posterior tibial, are equal and strong bilaterally     Neuro exam:  No focal deficits   Psy Exam: Normal affect, does not appear anxious or agitated        Recent Results (from the past 12 hour(s))   PTT    Collection Time: 04/15/19  8:59 PM   Result Value Ref Range    aPTT 27.7 22.1 - 32.0 sec    aPTT, therapeutic range     58.0 - 77.0 SECS   TROPONIN I    Collection Time: 04/15/19 11:43 PM   Result Value Ref Range    Troponin-I, Qt. 10.80 (H) <0.05 ng/mL   CK    Collection Time: 04/15/19 11:43 PM   Result Value Ref Range     (H) 39 - 308 U/L   PTT    Collection Time: 04/16/19  2:32 AM   Result Value Ref Range    aPTT 42.3 (H) 22.1 - 32.0 sec    aPTT, therapeutic range     58.0 - 77.0 SECS   CBC W/O DIFF    Collection Time: 04/16/19  5:39 AM   Result Value Ref Range    WBC 10.9 4.1 - 11.1 K/uL    RBC 5.23 4. 10 - 5.70 M/uL    HGB 15.7 12.1 - 17.0 g/dL    HCT 47.8 36.6 - 50.3 %    MCV 91.4 80.0 - 99.0 FL    MCH 30.0 26.0 - 34.0 PG    MCHC 32.8 30.0 - 36.5 g/dL    RDW 12.4 11.5 - 14.5 %    PLATELET 943 253 - 744 K/uL    MPV 9.6 8.9 - 12.9 FL    NRBC 0.0 0  WBC    ABSOLUTE NRBC 0.00 0.00 - 0.01 K/uL   MAGNESIUM    Collection Time: 04/16/19  5:39 AM   Result Value Ref Range    Magnesium 2.1 1.6 - 2.4 mg/dL   METABOLIC PANEL, BASIC    Collection Time: 04/16/19  5:39 AM   Result Value Ref Range    Sodium 134 (L) 136 - 145 mmol/L Potassium 3.8 3.5 - 5.1 mmol/L    Chloride 103 97 - 108 mmol/L    CO2 25 21 - 32 mmol/L    Anion gap 6 5 - 15 mmol/L    Glucose 198 (H) 65 - 100 mg/dL    BUN 8 6 - 20 MG/DL    Creatinine 0.84 0.70 - 1.30 MG/DL    BUN/Creatinine ratio 10 (L) 12 - 20      GFR est AA >60 >60 ml/min/1.73m2    GFR est non-AA >60 >60 ml/min/1.73m2    Calcium 8.9 8.5 - 10.1 MG/DL   PHOSPHORUS    Collection Time: 04/16/19  5:39 AM   Result Value Ref Range    Phosphorus 3.9 2.6 - 4.7 MG/DL   TROPONIN I    Collection Time: 04/16/19  5:39 AM   Result Value Ref Range    Troponin-I, Qt. 9.47 (H) <0.05 ng/mL   CK    Collection Time: 04/16/19  5:39 AM   Result Value Ref Range     (H) 39 - 308 U/L       Zac Delgado MS [facility]

## 2019-04-16 NOTE — PROGRESS NOTES
0700: Bedside shift change report given to Dylan Cuellar RN (oncoming nurse) by Yony Howell RN (offgoing nurse). Report included the following information SBAR, Intake/Output, MAR, Accordion, Recent Results, Med Rec Status and Cardiac Rhythm NSR. Chart, orders, labs reviewed. ITRACE complete. Heparin gtt infusing at 13units/kg/hr. 0715: Assessment complete see flowsheet. Patient awake/alert, eyes open spontaneously, YOUNGBLOOD. Denies CP/ SOB at this time. 1760: Cardiology at bedside. Plan is for cardiac cath at 1000. Procedure, risks, and benefits discussed at bedside. Consents obtained and placed in chart. 7840: Echo being done at bedside  1022: Patient taken off unit to cath lab  1213: TRANSFER - IN REPORT:    Verbal report received from KELLY Aaron(name) on [facility]  being received from cath lab(unit) for routine progression of care      Report consisted of patients Situation, Background, Assessment and   Recommendations(SBAR). Information from the following report(s) SBAR and Procedure Summary was reviewed with the receiving nurse. Opportunity for questions and clarification was provided. Assessment completed upon patients arrival to unit and care assumed. Anticipating patient to arrive with right radial TR band inflated to 10cc, angio max infusing at 1.75mg/kg/hr, heparin gtt off. Per cath lab RN, angiomax is to finish infusing, may start deflating TR band 2 hours from when angiomax stopped. R radial cath site assessed, cool to touch, patient denies numbness/tingling. Denies CP/SOB. 1+ right radial palpable pulse. Cap refill <3 sec. No s/s bleeding noted at this time. 1336: Angiomax bag empty. Patient without complaints at this time. No s/s distress. R radial TR band site clean dry intact. 1545: 2 hours post angiomax stopped, beginning to remove TR band. 2cc air let out. No s/s bleeding, cap refill <3 sec, skin pink, warm, dry, no hematoma, r radial pulse 1+. VSS on monitor.      1550: 2 cc air let out of TR band,  No s/s bleeding, cap refill <3 sec, skin pink, warm, dry, no hematoma, r radial pulse 2+. VSS on monitor. 1600: 2cc air let out of TR band,  No s/s bleeding, cap refill <3 sec, skin pink, warm, dry, no hematoma, r radial pulse 2+. VSS on monitor. 1605: 2cc  air let out of TR band,  No s/s bleeding, cap refill <3 sec, skin pink, warm, dry, no hematoma, r radial pulse 2+. VSS on monitor. 1610: 2cc  air let out of TR band,  No s/s bleeding, cap refill <3 sec, skin pink, warm, dry, no hematoma, r radial pulse 2+. VSS on monitor. TR band complete deflated at this time. Will monitor site closely and re inflate if s/s bleeding occur. 1615: Quick clot with tegaderm applied to R radial cath site. Arm board re-applied. No s/s bleeding, cap refill <3 sec, skin pink, warm, dry, 2+ radial pulse palpable, no s/s bleeding at this time, no hematoma seen at this time, will continue to monitor closely. 1630: Re assessment complete see flowsheet. R wrist cath site clean dry intact, no s/s bleeding, no hematoma, refer to post cath flow sheet. Patient without complaints. VSS. NSR on monitor. Call bell in reach. 1641: Dr. Sami Krishnan at bedside assessing patient. 1800: Patient resting in bed, no distress, education provided to patient on BB, verbalizes understanding. Denies pain, CP/SOB. R radial cath site CDI. Call bell in reach. 1900: Bedside shift change report given to KELLY English (oncoming nurse) by Cornelious Felty, RN (offgoing nurse). Report included the following information SBAR, Procedure Summary, MAR, Accordion, Recent Results and Cardiac Rhythm NSR.

## 2019-04-16 NOTE — PROGRESS NOTES
[facility]     Name: Tim Zhong MRN: [id number]   : 1980 Hospital: [facility]   Date: 2019        Impression Plan   1. NSTEMI  2. Tobacco abuse  3. Chest pain               · S/p stent  · BB  · Lipitor  · Brilinta  · Smoking cessations           Radiology  ( personally reviewed) CTA: no PE, no infiltrates   ABG No results for input(s): PHI, PO2I, PCO2I in the last 72 hours. Subjective     Cc: chest pain    46 yo who presented with epigastric chest pain. Was found to have elevated troponins. Was taken to cath this morning with stent to proximal LAD. Is now chest pain free. Denies SOB. Smokes 1 ppd. Denies snoring or apnic episodes. Review of Systems:  A comprehensive review of systems was negative except for that written in the HPI. Past Medical History:   Diagnosis Date    High cholesterol     NSTEMI (non-ST elevated myocardial infarction) ([facility] [address] 04/15/2019    Tobacco abuse 4/15/2019      No past surgical history on file. Prior to Admission medications    Medication Sig Start Date End Date Taking? Authorizing Provider   ticagrelor (BRILINTA) 90 mg tablet Take 1 Tab by mouth two (2) times a day. 19  Yes Deyanira Crabtree NP   ticagrelor (BRILINTA) 90 mg tablet Take 1 Tab by mouth every twelve (12) hours. 19  Yes Deyanira Crabtree NP   levothyroxine (SYNTHROID) 125 mcg tablet Take 125 mcg by mouth Daily (before breakfast). Yes Provider, Historical   pravastatin (PRAVACHOL) 20 mg tablet Take 20 mg by mouth daily (with lunch). Yes Provider, Historical   acetaminophen (TYLENOL) 325 mg tablet Take 325 mg by mouth every six (6) hours as needed for Pain.    Yes Provider, Historical     Current Facility-Administered Medications   Medication Dose Route Frequency    atorvastatin (LIPITOR) tablet 40 mg  40 mg Oral QHS    sodium chloride (NS) flush 5-40 mL  5-40 mL IntraVENous Q8H    0.9% sodium chloride infusion  75 mL/hr IntraVENous CONTINUOUS    ticagrelor (BRILINTA) tablet 90 mg  90 mg Oral Q12H    metoprolol tartrate (LOPRESSOR) tablet 25 mg  25 mg Oral Q12H    sodium chloride (NS) flush 5-40 mL  5-40 mL IntraVENous Q8H    levothyroxine (SYNTHROID) tablet 125 mcg  125 mcg Oral ACB     Allergies   Allergen Reactions    Aspirin Hives      Social History     Tobacco Use    Smoking status: Current Every Day Smoker   Substance Use Topics    Alcohol use: Not on file      Family History   Problem Relation Age of Onset    High Cholesterol Mother     Hypertension Mother     Diabetes Other           Laboratory: I have personally reviewed the critical care flowsheet and labs.      Recent Labs     04/16/19  0539 04/15/19  1827   WBC 10.9 12.8*   HGB 15.7 15.5   HCT 47.8 46.8    276     Recent Labs     04/16/19  0539 04/15/19  1827   * 137   K 3.8 3.6    103   CO2 25 28   * 118*   BUN 8 10   CREA 0.84 1.00   CA 8.9 8.8   MG 2.1  --    PHOS 3.9  --    ALB  --  4.3   SGOT  --  53*   ALT  --  57       Objective:     Mode Rate Tidal Volume Pressure FiO2 PEEP                    Vital Signs:     TMAX(24)      Intake/Output:   Last shift:         Last 3 shifts: 04/16 0701 - 04/16 1900  In: 2511.5 [P.O.:480; I.V.:2031.5]  Out: - RRIOLAST3    Intake/Output Summary (Last 24 hours) at 4/16/2019 1648  Last data filed at 4/16/2019 1630  Gross per 24 hour   Intake 3430.4 ml   Output 1000 ml   Net 2430.4 ml     EXAM:   GENERAL: well developed and in no distress, HEENT:  PERRL, EOMI, no alar flaring or epistaxis, oral mucosa moist without cyanosis, NECK:  no jugular vein distention, no retractions, no thyromegaly or masses, LUNGS: CTA, no w/r/r , HEART:  Regular rate and rhythm with no MGR; no edema is present, ABDOMEN:  soft with no tenderness, bowel sounds present, EXTREMITIES:  warm with no cyanosis, SKIN:  no jaundice or ecchymosis and NEUROLOGIC:  alert and oriented, grossly non-focal    Tomás Albarado MD  [facility]

## 2019-04-17 VITALS
HEIGHT: 71 IN | SYSTOLIC BLOOD PRESSURE: 120 MMHG | DIASTOLIC BLOOD PRESSURE: 92 MMHG | BODY MASS INDEX: 30.19 KG/M2 | WEIGHT: 215.61 LBS | RESPIRATION RATE: 18 BRPM | OXYGEN SATURATION: 98 % | TEMPERATURE: 97.8 F | HEART RATE: 74 BPM

## 2019-04-17 LAB
ANION GAP SERPL CALC-SCNC: 5 MMOL/L (ref 5–15)
BASOPHILS # BLD: 0 K/UL (ref 0–0.1)
BASOPHILS NFR BLD: 0 % (ref 0–1)
BUN SERPL-MCNC: 8 MG/DL (ref 6–20)
BUN/CREAT SERPL: 9 (ref 12–20)
CALCIUM SERPL-MCNC: 8.8 MG/DL (ref 8.5–10.1)
CHLORIDE SERPL-SCNC: 107 MMOL/L (ref 97–108)
CO2 SERPL-SCNC: 25 MMOL/L (ref 21–32)
CREAT SERPL-MCNC: 0.88 MG/DL (ref 0.7–1.3)
DIFFERENTIAL METHOD BLD: ABNORMAL
EOSINOPHIL # BLD: 0.3 K/UL (ref 0–0.4)
EOSINOPHIL NFR BLD: 3 % (ref 0–7)
ERYTHROCYTE [DISTWIDTH] IN BLOOD BY AUTOMATED COUNT: 12.6 % (ref 11.5–14.5)
GLUCOSE SERPL-MCNC: 121 MG/DL (ref 65–100)
HCT VFR BLD AUTO: 48.1 % (ref 36.6–50.3)
HGB BLD-MCNC: 15.8 G/DL (ref 12.1–17)
IMM GRANULOCYTES # BLD AUTO: 0.1 K/UL (ref 0–0.04)
IMM GRANULOCYTES NFR BLD AUTO: 0 % (ref 0–0.5)
LYMPHOCYTES # BLD: 2.1 K/UL (ref 0.8–3.5)
LYMPHOCYTES NFR BLD: 17 % (ref 12–49)
MCH RBC QN AUTO: 30.2 PG (ref 26–34)
MCHC RBC AUTO-ENTMCNC: 32.8 G/DL (ref 30–36.5)
MCV RBC AUTO: 91.8 FL (ref 80–99)
MONOCYTES # BLD: 1.2 K/UL (ref 0–1)
MONOCYTES NFR BLD: 10 % (ref 5–13)
NEUTS SEG # BLD: 8.5 K/UL (ref 1.8–8)
NEUTS SEG NFR BLD: 70 % (ref 32–75)
NRBC # BLD: 0 K/UL (ref 0–0.01)
NRBC BLD-RTO: 0 PER 100 WBC
PLATELET # BLD AUTO: 275 K/UL (ref 150–400)
PMV BLD AUTO: 9.3 FL (ref 8.9–12.9)
POTASSIUM SERPL-SCNC: 4 MMOL/L (ref 3.5–5.1)
RBC # BLD AUTO: 5.24 M/UL (ref 4.1–5.7)
SODIUM SERPL-SCNC: 137 MMOL/L (ref 136–145)
TROPONIN I SERPL-MCNC: 3.65 NG/ML
WBC # BLD AUTO: 12.2 K/UL (ref 4.1–11.1)

## 2019-04-17 PROCEDURE — 84484 ASSAY OF TROPONIN QUANT: CPT

## 2019-04-17 PROCEDURE — 74011250637 HC RX REV CODE- 250/637: Performed by: INTERNAL MEDICINE

## 2019-04-17 PROCEDURE — 85025 COMPLETE CBC W/AUTO DIFF WBC: CPT

## 2019-04-17 PROCEDURE — 74011250637 HC RX REV CODE- 250/637: Performed by: NURSE PRACTITIONER

## 2019-04-17 PROCEDURE — 36415 COLL VENOUS BLD VENIPUNCTURE: CPT

## 2019-04-17 PROCEDURE — 80048 BASIC METABOLIC PNL TOTAL CA: CPT

## 2019-04-17 RX ORDER — METOPROLOL TARTRATE 25 MG/1
25 TABLET, FILM COATED ORAL EVERY 12 HOURS
Qty: 60 TAB | Refills: 3 | Status: SHIPPED | OUTPATIENT
Start: 2019-04-17 | End: 2022-04-22 | Stop reason: SDUPTHER

## 2019-04-17 RX ORDER — ATORVASTATIN CALCIUM 40 MG/1
40 TABLET, FILM COATED ORAL
Qty: 30 TAB | Refills: 3 | Status: SHIPPED | OUTPATIENT
Start: 2019-04-17 | End: 2022-09-30 | Stop reason: SDUPTHER

## 2019-04-17 RX ADMIN — METOPROLOL TARTRATE 25 MG: 25 TABLET ORAL at 08:33

## 2019-04-17 RX ADMIN — LEVOTHYROXINE SODIUM 125 MCG: 125 TABLET ORAL at 08:33

## 2019-04-17 RX ADMIN — Medication 10 ML: at 05:39

## 2019-04-17 RX ADMIN — TICAGRELOR 90 MG: 90 TABLET ORAL at 08:33

## 2019-04-17 RX ADMIN — Medication 10 ML: at 05:40

## 2019-04-17 NOTE — PROGRESS NOTES
1900 Bedside and Verbal shift change report given to Franklin Kline  (oncoming nurse) by Noise Freaks (offgoing nurse). Report included the following information SBAR, Kardex, Intake/Output, MAR, Accordion, Recent Results, Med Rec Status, Cardiac Rhythm normal sinus, Alarm Parameters , Pre Procedure Checklist and Procedure Verification. Shift Summary: Patient had uneventful shift, no chest pain and no complications due to cath, right wrist site dry and intact. 0700 Bedside and Verbal shift change report given to Ava Aquino  (oncoming nurse) by Franklin Kline RN (offgoing nurse).  Report included the following information SBAR, Kardex, Intake/Output, MAR, Accordion, Recent Results, Med Rec Status, Cardiac Rhythm normal sinus, Alarm Parameters  and Pre Procedure Checklist.

## 2019-04-17 NOTE — PROGRESS NOTES
Follow-up visit  :    I met with pt as a follow-up visit. Pt states he has had no further chest pain. He states he feels much better today. Pt is hopeful to be discharged home today. When discharged,pt.'s girlfriend/family will transport pt home. Plan for today. 1.Pt informed me that his girlfriend picked up his brilinta @ his pharmacy yesterday and the coupon was effective with reducing the cost of his brilinta to 5 dollars/month. 2.Pt has been accepted by [facility] for the hospital to home MI visit. 3.Pt has a follow-up appointment on 4/22/19 With cardiologist,Dr Laureano Haynes. 4.Pt will follow-up with Dr. Sachi Auguste ,PCP in the outpatient setting. There are no nurse navigators to notify. 5.Pt plans to attend outpatient cardiac rehab @ [facility]. 6.Smoking cessation discussed by [facility] treatment team members and healthy eating choices as pt stated recently he had been binging on fast food. 7.For any additional discharge plans,please notify me @ [phone]. 8.From a case management perspective,there are no other discharge needs identified . Pt is okay for discharge from a case management perspective.     Barb Bonner

## 2019-04-17 NOTE — DISCHARGE INSTRUCTIONS
ACUTE DIAGNOSES:  NSTEMI (non-ST elevated myocardial infarction) ([facility] [address] [I21.4]    CHRONIC MEDICAL DIAGNOSES:  Problem List as of 4/17/2019 Date Reviewed: 4/17/2019          Codes Class Noted - Resolved    * (Principal) NSTEMI (non-ST elevated myocardial infarction) ([facility] ICD-10-CM: I21.4  ICD-9-CM: 410.70  4/15/2019 - Present        High cholesterol (Chronic) ICD-10-CM: E78.00  ICD-9-CM: 272.0  4/15/2019 - Present        Tobacco abuse (Chronic) ICD-10-CM: Z72.0  ICD-9-CM: 305.1  4/15/2019 - Present        Leukocytosis ICD-10-CM: X48.154  ICD-9-CM: 288.60  4/15/2019 - Present              DISCHARGE MEDICATIONS:           · It is important that you take the medication exactly as they are prescribed. · Keep your medication in the bottles provided by the pharmacist and keep a list of the medication names, dosages, and times to be taken in your wallet. · Do not take other medications without consulting your doctor. DIET:  Cardiac Diet    ACTIVITY: Activity as tolerated    ADDITIONAL INFORMATION: If you experience any of the following symptoms then please call your primary care physician or return to the emergency room if you cannot get hold of your doctor: Fever, chills, nausea, vomiting, diarrhea, change in mentation, falling, bleeding, shortness of breath. FOLLOW UP CARE:  Dr. Perez Vargas MD  you are to call and set up an appointment to see them in 2 weeks. Follow-up with cardiology on 4/22/19      Information obtained by :  I understand that if any problems occur once I am at home I am to contact my physician. I understand and acknowledge receipt of the instructions indicated above.                                                                                                                                            Physician's or R.N.'s Signature                                                                  Date/Time Patient or Representative Signature                                                          Date/Time    Radial Cardiac Catheterization/Angiography Discharge Instructions   It is normal to feel tired the first couple days. Take it easy and follow the physicians instructions. CHECK THE CATHETER INSERTION SITE DAILY:   Remove the wrist dressing 24 hours after the procedure. You may shower 24 hours after the procedure. Wash with soap and water and pat dry. Gentle cleaning of the site with soap and water is sufficient, cover with a dry clean dressing or bandage. Do not apply creams or powders to the area. No soaking the wrist for 3 days. Leave the puncture site open to air after 24 hours post-procedure. CALL THE PHYSICIANS:   If the site becomes red, swollen or feels warm to the touch   If there is bleeding or drainage or if there is unusual pain at the radial site. If there is any minor oozing, you may apply a band-aid and remove after 12 hours. If the bleeding continues, hold pressure with the middle finger against the puncture site and the thumb against the back of the wrist,call 911 to be transported to the hospital.   DO NOT DRIVE YOURSELF, [facility] ACTIVITY:   For the first 24 hours do not manipulate the wrist.   No lifting, pushing or pulling over 3-5 pounds with the affected wrist for 7 daysand no straining the insertion site. Do not life grocery bags or the garbage can, do not run the vacuum  or  for 7 days. Start with short walks as in the hospital and gradually increase as tolerated each day. It is recommended to walk 30 minutes 5-7 days per week. Follow your physicians instructions on activity. Avoid walking outside in extremes of heat or cold. Walk inside when it is cold and windy or hot and humid.    Things to keep in mind:   No driving for at least 24 hours, or as designated by your physician. Limit the number of times you go up and down the stairs   Take rests and pace yourself with activity. Be careful and do not strain with bowel movements. MEDICATIONS:   Take all medications as prescribed   Call your physician if you have any questions   Keep an updated list of your medications with you at all times and give a list to your physician and pharmacist   SIGNS AND SYMPTOMS:   Be cautious of symptoms of angina or recurrent symptoms such as chest discomfort, unusual shortness of breath or fatigue. These could be symptoms of restenosis, a new blockage or a heart attack. If your symptoms are relieved with rest it is still recommended that you notify your physician of recurrent chest pain or discomfort. For CHEST PAIN or symptoms of angina not relieved with rest: If the discomfort is not relieved with rest, and you have been prescribed Nitroglycerin, take as directed (taken under the tongue, one at a time 5 minutes apart for a total of 3 doses). If the discomfort is not relieved after the 3rd nitroglycerin, call 911. If you have not been prescribed Nitroglycerin and your chest discomfort is not relieved with rest, call 911. AFTER CARE:   Follow up with your physician as instructed. Follow a heart healthy diet with proper portion control, daily stress management, daily exercise, blood pressure and cholesterol control , and smoking cessation. Patient Education        Heart Attack: Care Instructions  Your Care Instructions    A heart attack (myocardial infarction, or MI) occurs when one or more of the coronary arteries, which supply the heart with oxygen-rich blood, is blocked. A blockage usually occurs when plaque inside the artery breaks open and a blood clot forms in the artery. After a heart attack, you may be worried about your future.  Over the next several weeks, your heart will start to heal. Though it can be hard to break old habits, you can prevent another heart attack by making some lifestyle changes and by taking medicines. You may use this information for ideas about what to do at home to speed your recovery. Follow-up care is a key part of your treatment and safety. Be sure to make and go to all appointments, and call your doctor if you are having problems. It's also a good idea to know your test results and keep a list of the medicines you take. How can you care for yourself at home? Activity    · Until your doctor says it is okay, do not do strenuous exercise. And do not lift, pull, or push anything heavy. Ask your doctor what types of activities are safe for you.     · If your doctor has not set you up with a cardiac rehabilitation (rehab) program, talk to him or her about whether that is right for you. Cardiac rehab includes supervised exercise. It also includes help with diet and lifestyle changes and emotional support. It may reduce your risk of future heart problems.     · Increase your activities slowly. Take short rest breaks when you get tired.     · If your doctor recommends it, get more exercise. Walking is a good choice. Bit by bit, increase the amount you walk every day. Try for at least 30 minutes on most days of the week. You also may want to swim, bike, or do other activities. Talk with your doctor before you start an exercise program to make sure it is safe for you.     · Ask your doctor when you can drive, go back to work, and do other daily activities again.     · You can have sex as soon as you feel ready for it. Often this means when you can easily walk around or climb stairs. Talk with your doctor if you have any concerns. If you are taking nitroglycerin, do not take erection-enhancing medicine such as sildenafil (Viagra), tadalafil (Cialis), or vardenafil (Levitra) .    Lifestyle changes    · Do not smoke. Smoking increases your risk of another heart attack.  If you need help quitting, talk to your doctor about stop-smoking programs and medicines. These can increase your chances of quitting for good.     · Eat a heart-healthy diet that is low in saturated fat and salt, and is full of fruits, vegetables and whole-grains. Eat at least two servings of fish each week. You may get more details about how to eat healthy. But these tips can help you get started.     · Stay at a healthy weight, or lose weight if you need to. Medicines    · Be safe with medicines. Take your medicines exactly as prescribed. Call your doctor if you think you are having a problem with your medicine. You will get more details on the specific medicines your doctor prescribes. Do not stop taking your medicine unless your doctor tells you to. Not taking your medicine might raise your risk of having another heart attack.     · You may need several medicines to help lower your risk of another heart attack. These include:  ? Blood pressure medicines such as angiotensin-converting enzyme (ACE) inhibitors, ARBs (angiotensin II receptor blockers), and beta-blockers. ? Cholesterol medicine called statins. ? Aspirin and other blood thinners. These prevent blood clots that can cause a heart attack.     · If your doctor has given you nitroglycerin, keep it with you at all times. If you have angina symptoms, such as chest pain or pressure, sit down and rest. Take the first dose of nitroglycerin as directed. If symptoms get worse or are not getting better within 5 minutes, call 911 right away. Stay on the phone. The emergency  will tell you what to do.     · Do not take any over-the-counter medicines, vitamins, or herbal products without talking to your doctor first.    Staying healthy    · Manage other health conditions such as high blood pressure and diabetes.     · Avoid colds and flu. Get a pneumococcal vaccine shot. If you have had one before, ask your doctor whether you need another dose. Get the flu vaccine every year.  If you must be around people with colds or flu, wash your hands often.     · Be sure to tell your doctor about any angina symptoms you have had, even if they went away. Pay attention to your angina symptoms. Know what is typical for you and learn how to control it. Know when to call for help.     · Talk to your family, friends, or a counselor about your feelings. It is normal to feel frightened, angry, hopeless, helpless, and even guilty. Talking openly about bad feelings can help you cope. If you have symptoms of depression, talk to your doctor. When should you call for help? Call 911 anytime you think you may need emergency care. For example, call if:    · You have symptoms of a heart attack. These may include:  ? Chest pain or pressure, or a strange feeling in the chest.  ? Sweating. ? Shortness of breath. ? Nausea or vomiting. ? Pain, pressure, or a strange feeling in the back, neck, jaw, or upper belly or in one or both shoulders or arms. ? Lightheadedness or sudden weakness. ? A fast or irregular heartbeat. After you call 911, the  may tell you to chew 1 adult-strength or 2 to 4 low-dose aspirin. Wait for an ambulance. Do not try to drive yourself.     · You have angina symptoms (such as chest pain or pressure) that do not go away with rest or are not getting better within 5 minutes after you take a dose of nitroglycerin.     · You passed out (lost consciousness).     · You feel like you are having another heart attack.    Call your doctor now or seek immediate medical care if:    · You are having angina symptoms, such as chest pain or pressure, more often than usual, or the symptoms are different or worse than usual.     · You have new or increased shortness of breath.     · You are dizzy or lightheaded, or you feel like you may faint.    Watch closely for changes in your health, and be sure to contact your doctor if you have any problems. Where can you learn more? Go to http://helen-yamilet.info/.   Enter A211 in the search box to learn more about \"Heart Attack: Care Instructions. \"  Current as of: July 22, 2018  Content Version: 11.9  © 1551-2854 [employer]. Care instructions adapted under license by [employer] (which disclaims liability or warranty for this information). If you have questions about a medical condition or this instruction, always ask your healthcare professional. [address] any warranty or liability for your use of this information. Stopping Smoking: Care Instructions  Your Care Instructions  Cigarette smokers crave the nicotine in cigarettes. Giving it up is much harder than simply changing a habit. Your body has to stop craving the nicotine. It is hard to quit, but you can do it. There are many tools that people use to quit smoking. You may find that combining tools works best for you. There are several steps to quitting. First you get ready to quit. Then you get support to help you. After that, you learn new skills and behaviors to become a nonsmoker. For many people, a necessary step is getting and using medicine. Your doctor will help you set up the plan that best meets your needs. You may want to attend a smoking cessation program to help you quit smoking. When you choose a program, look for one that has proven success. Ask your doctor for ideas. You will greatly increase your chances of success if you take medicine as well as get counseling or join a cessation program.  Some of the changes you feel when you first quit tobacco are uncomfortable. Your body will miss the nicotine at first, and you may feel short-tempered and grumpy. You may have trouble sleeping or concentrating. Medicine can help you deal with these symptoms. You may struggle with changing your smoking habits and rituals. The last step is the tricky one: Be prepared for the smoking urge to continue for a time. This is a lot to deal with, but keep at it.  You will feel better. Follow-up care is a key part of your treatment and safety. Be sure to make and go to all appointments, and call your doctor if you are having problems. It's also a good idea to know your test results and keep a list of the medicines you take. How can you care for yourself at home? · Ask your family, friends, and coworkers for support. You have a better chance of quitting if you have help and support. · Join a support group, such as Nicotine Anonymous, for people who are trying to quit smoking. · Consider signing up for a smoking cessation program, such as the American Lung Association's Freedom from Smoking program.  · Get text messaging support. Go to the website at www.smokefree. gov to sign up for the [facility] program.  · Set a quit date. Pick your date carefully so that it is not right in the middle of a big deadline or stressful time. Once you quit, do not even take a puff. Get rid of all ashtrays and lighters after your last cigarette. Clean your house and your clothes so that they do not smell of smoke. · Learn how to be a nonsmoker. Think about ways you can avoid those things that make you reach for a cigarette. ? Avoid situations that put you at greatest risk for smoking. For some people, it is hard to have a drink with friends without smoking. For others, they might skip a coffee break with coworkers who smoke. ? Change your daily routine. Take a different route to work or eat a meal in a different place. · Cut down on stress. Calm yourself or release tension by doing an activity you enjoy, such as reading a book, taking a hot bath, or gardening. · Talk to your doctor or pharmacist about nicotine replacement therapy, which replaces the nicotine in your body. You still get nicotine but you do not use tobacco. Nicotine replacement products help you slowly reduce the amount of nicotine you need.  These products come in several forms, many of them available over-the-counter:  ? Nicotine patches  ? Nicotine gum and lozenges  ? Nicotine inhaler  · Ask your doctor about bupropion (Wellbutrin) or varenicline (Chantix), which are prescription medicines. They do not contain nicotine. They help you by reducing withdrawal symptoms, such as stress and anxiety. · Some people find hypnosis, acupuncture, and massage helpful for ending the smoking habit. · Eat a healthy diet and get regular exercise. Having healthy habits will help your body move past its craving for nicotine. · Be prepared to keep trying. Most people are not successful the first few times they try to quit. Do not get mad at yourself if you smoke again. Make a list of things you learned and think about when you want to try again, such as next week, next month, or next year. Where can you learn more? Go to http://helen-yamilet.info/. Enter G144 in the search box to learn more about \"Stopping Smoking: Care Instructions. \"  Current as of: September 26, 2018  Content Version: 11.9  © 5312-0457 [employer]. Care instructions adapted under license by [employer] (which disclaims liability or warranty for this information). If you have questions about a medical condition or this instruction, always ask your healthcare professional. [address] any warranty or liability for your use of this information.

## 2019-04-17 NOTE — PROGRESS NOTES
Cardiology Progress Note       [facility]  NAME:  Tessa Vieyra   :   1980   MRN:   [id number]     Assessment/Plan:   1. NSTEMI s/p PCI to diag (95% stenosis) > No asa 2/2 allergy. Recommend OP desensitization with allergist.  Clarence Boggs. BB. Reviewed d/c instructions, activity restrictions. EF preserved. Refer to Phase 2 cardiac rehab. H2 H visit post MI.  2. HTN: cont BB, no ACE-I due to marginal BP, readdress as OP  3. HLD: changed to high potency statin LDL goal < 70.   4. Hypothryoidism: cont synthroid      Home today after ambulation  Follow-up Information     Follow up With Specialties Details Why Contact Info    Rosalee Kayser, MD Cardiology On 2019 1:40 pm  [address] [facility]  [phone]                   Pt personally seen and examined. Chart reviewed. Agree with advanced NP's history, exam and  A/P with changes/additons. Doing well. No CP    Not on ASA secondary to ASA allergy. On Brilinta. Will refer to allergist as OP for densensitzation    Discussed with patient/nursing    Yvonne Espinoza MD, [facility] [place]      Subjective:   Subjective:  Tessa Vieyra is a 45 y.o.male  with PMH significant for HLD, tobacco use who presented to the ED with chest pain . Patient reports substernal chest pain that started yesterday after eating beans and chickpeas for lunch. Patient reports pain was originally intermittent and now is constant. Patient tried zantac with mild improvement. Patient seen at [facility] today and had elevated troponin and D dimer. Patient was sent to ED by EMS after getting sublingual nitro with some relief of pain. Patient denies any associated symptoms, denies any alleviating or aggravating symptoms. Denies shortness of breath. Patient reports history of hypothyroidism and hyperlipidemia.  No family hx CAD.        Cardiac ROS: Patient denies any exertional chest pain, dyspnea, palpitations, syncope, orthopnea, edema or paroxysmal nocturnal dyspnea. Previous Cardiac Eval:  Lipids 19 - , , HDL 37, VLDL 48.4, .6    Cath 19 LCA - 3 DRC ( was trying to engage RCA); RCA - AL1   L Main: Nml LAD: Med size; 30%; D1 - small 40%; D2 - small to med - Ostial 50%; Prox 95%; TIMi1 flow LCflex: Med; MLI; OM1- small ; OM2 - med; ( Bifurcating) - MLI   RCA: Dominant; Med to Large; MLI      Review of Systems: No nausea, indigestion, vomiting, pain, cough, sputum. No bleeding. Taking po. Appetite ok. Objective:     Visit Vitals  /69   Pulse 70   Temp 98.3 °F (36.8 °C)   Resp 17   Ht 5' 11\" (1.803 m)   Wt 215 lb 9.8 oz (97.8 kg)   SpO2 98%   BMI 30.07 kg/m²      O2 Device: Room air    Temp (24hrs), Av.3 °F (36.8 °C), Min:97.6 °F (36.4 °C), Max:98.9 °F (37.2 °C)      No intake/output data recorded. 04/15 1901 -  0700  In: 5290.4 [P.O.:2160; I.V.:3130.4]  Out: 1500 [Urine:1500]     TELE: SR    General: AAOx3 cooperative, no acute distress. HEENT: Atraumatic. Pink and moist.  Anicteric sclerae. Neck : Supple  Lungs: CTA bilaterally. No wheezing/rhonchi/rales. Heart: Regular rhythm, no murmur. No JVD. No carotid bruits. Abdomen: Soft, non-distended, non-tender. + Bowel sounds. Extremities: R wrist: soft no hematoma radial pulse 2+. No edema. Neurologic: Grossly intact. Alert and oriented X 3. No acute neurological distress. Psych: Good insight. Not anxious or agitated.       Care Plan discussed with:    Comments   Patient x    Family      RN x    Care Manager x                   Consultant:  x        Data Review:     No lab exists for component: ITNL   Recent Labs     19  0534 19  0539 04/15/19  2343   CPK  --  338* 368*   TROIQ 3.65* 9.47* 10.80*     Recent Labs     19  0534 19  0539 04/15/19  1827    134* 137   K 4.0 3.8 3.6    103 103   CO2 25 25 28   BUN 8 8 10   CREA 0.88 0.84 1.00   * 198* 118*   PHOS  --  3.9  --    MG  -- 2.1  --    ALB  --   --  4.3   WBC 12.2* 10.9 12.8*   HGB 15.8 15.7 15.5   HCT 48.1 47.8 46.8    281 276     Recent Labs     04/16/19  0232 04/15/19  2059   APTT 42.3* 27.7       Medications reviewed  Current Facility-Administered Medications   Medication Dose Route Frequency    atorvastatin (LIPITOR) tablet 40 mg  40 mg Oral QHS    sodium chloride (NS) flush 5-40 mL  5-40 mL IntraVENous Q8H    sodium chloride (NS) flush 5-40 mL  5-40 mL IntraVENous PRN    ticagrelor (BRILINTA) tablet 90 mg  90 mg Oral Q12H    metoprolol tartrate (LOPRESSOR) tablet 25 mg  25 mg Oral Q12H    sodium chloride (NS) flush 5-40 mL  5-40 mL IntraVENous Q8H    sodium chloride (NS) flush 5-40 mL  5-40 mL IntraVENous PRN    acetaminophen (TYLENOL) tablet 650 mg  650 mg Oral Q4H PRN    oxyCODONE-acetaminophen (PERCOCET) 5-325 mg per tablet 1 Tab  1 Tab Oral Q4H PRN    HYDROmorphone (PF) (DILAUDID) injection 0.5 mg  0.5 mg IntraVENous Q4H PRN    prochlorperazine (COMPAZINE) injection 10 mg  10 mg IntraVENous Q6H PRN    zolpidem (AMBIEN) tablet 5 mg  5 mg Oral QHS PRN    levothyroxine (SYNTHROID) tablet 125 mcg  125 mcg Oral ACB         Earlene Costa NP

## 2019-04-17 NOTE — PROGRESS NOTES
Spiritual Care Assessment/Progress Note  [address]      NAME: Francesca Zabala      MRN: [id number]  AGE: 45 y.o. SEX: male  Mosque Affiliation: Unknown   Language: English     4/17/2019     Total Time (in minutes): 10     Spiritual Assessment begun in [facility] through conversation with:         [x]Patient        [] Family    [x] Friend(s)        Reason for Consult: Initial/Spiritual assessment, patient floor     Spiritual beliefs: (Please include comment if needed)     [] Identifies with a odalis tradition:         [] Supported by a odalis community:            [x] Claims no spiritual orientation:           [] Seeking spiritual identity:                [] Adheres to an individual form of spirituality:           [] Not able to assess:                           Identified resources for coping:      [] Prayer                               [] Music                  [] Guided Imagery     [x] Family/friends                 [] Pet visits     [] Devotional reading                         [] Unknown     [] Other:                                               Interventions offered during this visit: (See comments for more details)    Patient Interventions: Affirmation of emotions/emotional suffering, Coping skills reviewed/reinforced, Iconic (affirming the presence of God/Higher Power)     Family/Friend(s):  Affirmation of emotions/emotional suffering, Coping skills reviewed/reinforced, Iconic (affirming the presence of God/Higher Power)(Girlfriend)     Plan of Care:     [] Support spiritual and/or cultural needs    [] Support AMD and/or advance care planning process      [] Support grieving process   [] Coordinate Rites and/or Rituals    [] Coordination with community clergy   [] No spiritual needs identified at this time   [] Detailed Plan of Care below (See Comments)  [] Make referral to Music Therapy  [] Make referral to Pet Therapy     [] Make referral to Addiction services  [] Make referral to [facility]  [] Make referral to Spiritual Care Partner  [] No future visits requested        [x] Follow up visits as needed     Comments:  visit for initial spiritual assessment. Patient reclining in bed, girlfriend is at bedside. Says he is feeling much better and is being discharged to home today. Provided spiritual presence and listening as he spoke about his present thoughts. Feelings, and concerns. No spiritual needs identifies at this time. Patient stated he would like to speak with someone concerning billing, this information was passed to the nursing staff. Will continue to follow up as needed and upon request as able. Visited by Rev. Avis Mcintosh, [address] [place] [address] Road paging service: [phone])

## 2019-04-17 NOTE — PROGRESS NOTES
Bedside shift change report given to Trino Menjivar RN (oncoming nurse) by Juan Antonio Ryan RN (offgoing nurse). Report included the following information SBAR, Kardex, Intake/Output and MAR.     0800: Patient alert and oriented, no signs of distress. Cath site CDI. 1000: Patient ambulated around the unit w/ RN. No signs of distress. 1100: Patient discharged per orders. I have reviewed discharge instructions with the patient and spouse. The patient and spouse verbalized understanding.

## 2019-04-17 NOTE — PROGRESS NOTES
Drew Ingram damian [address]  [address] [facility], [place], [address] [place] [address]  [phone]      Medical Progress Note      NAME: Levar Silva   :  1980  MRM:  [id number]    Date/Time: 2019  1:18 PM       Assessment and Plan:   1. NSTEMI (non-ST elevated myocardial infarction) ([facility] [address] (4/15/2019). S/p cardiac cath and stent. Echocardiogram is normal. Started on brilinta and metoprolol. allergy to ASA          2. High cholesterol (4/15/2019). On statin     3. Leukocytosis (4/15/2019). Likely due to acute stress. 4.  Tobacco abuse (4/15/2019). Counseled on cessation. Subjective:     Chief Complaint:  Follow up of pt who was admitted with NSTEMI. Feels well    ROS:  (bold if positive, if negative)      Tolerating PT  Tolerating Diet        Objective:     Last 24hrs VS reviewed since prior progress note.  Most recent are:    Visit Vitals  BP (!) 120/92   Pulse 74   Temp 97.8 °F (36.6 °C)   Resp 18   Ht 5' 11\" (1.803 m)   Wt 97.8 kg (215 lb 9.8 oz)   SpO2 98%   BMI 30.07 kg/m²     SpO2 Readings from Last 6 Encounters:   19 98%            Intake/Output Summary (Last 24 hours) at 2019 0935  Last data filed at 2019 0400  Gross per 24 hour   Intake 4196.69 ml   Output 500 ml   Net 3696.69 ml        Physical Exam:    Gen:  Well-developed, well-nourished, in no acute distress  HEENT:  Pink conjunctivae, PERRL, hearing intact to voice, moist mucous membranes  Neck:  Supple, without masses, thyroid non-tender  Resp:  No accessory muscle use, clear breath sounds without wheezes rales or rhonchi  Card:  No murmurs, normal S1, S2 without thrills, bruits or peripheral edema  Abd:  Soft, non-tender, non-distended, normoactive bowel sounds are present, no palpable organomegaly and no detectable hernias  Lymph:  No cervical or inguinal adenopathy  Musc:  No cyanosis or clubbing  Skin:  No rashes or ulcers, skin turgor is good  Neuro:  Cranial nerves are grossly intact, no focal motor weakness, follows commands appropriately  Psych:  Good insight, oriented to person, place and time, alert  __________________________________________________________________  Medications Reviewed: (see below)  Medications:     Current Facility-Administered Medications   Medication Dose Route Frequency    atorvastatin (LIPITOR) tablet 40 mg  40 mg Oral QHS    sodium chloride (NS) flush 5-40 mL  5-40 mL IntraVENous Q8H    sodium chloride (NS) flush 5-40 mL  5-40 mL IntraVENous PRN    ticagrelor (BRILINTA) tablet 90 mg  90 mg Oral Q12H    metoprolol tartrate (LOPRESSOR) tablet 25 mg  25 mg Oral Q12H    sodium chloride (NS) flush 5-40 mL  5-40 mL IntraVENous Q8H    sodium chloride (NS) flush 5-40 mL  5-40 mL IntraVENous PRN    acetaminophen (TYLENOL) tablet 650 mg  650 mg Oral Q4H PRN    oxyCODONE-acetaminophen (PERCOCET) 5-325 mg per tablet 1 Tab  1 Tab Oral Q4H PRN    HYDROmorphone (PF) (DILAUDID) injection 0.5 mg  0.5 mg IntraVENous Q4H PRN    prochlorperazine (COMPAZINE) injection 10 mg  10 mg IntraVENous Q6H PRN    zolpidem (AMBIEN) tablet 5 mg  5 mg Oral QHS PRN    levothyroxine (SYNTHROID) tablet 125 mcg  125 mcg Oral ACB        Lab Data Reviewed: (see below)  Lab Review:     Recent Labs     04/17/19  0534 04/16/19  0539 04/15/19  1827   WBC 12.2* 10.9 12.8*   HGB 15.8 15.7 15.5   HCT 48.1 47.8 46.8    281 276     Recent Labs     04/17/19  0534 04/16/19  0539 04/15/19  1827    134* 137   K 4.0 3.8 3.6    103 103   CO2 25 25 28   * 198* 118*   BUN 8 8 10   CREA 0.88 0.84 1.00   CA 8.8 8.9 8.8   MG  --  2.1  --    PHOS  --  3.9  --    ALB  --   --  4.3   TBILI  --   --  0.4   SGOT  --   --  53*   ALT  --   --  57     No results found for: GLUCPOC  No results for input(s): PH, PCO2, PO2, HCO3, FIO2 in the last 72 hours. No results for input(s): INR in the last 72 hours.     No lab exists for component: INREXT, INREXT  All Micro Results     None          I have reviewed notes of prior 24hr. Other pertinent lab:       Total time spent with patient: [address] discussed with: Patient, Nursing Staff and >50% of time spent in counseling and coordination of care    Discussed:  Care Plan    Prophylaxis:  Lovenox    Disposition:  Home w/Family           ___________________________________________________    Attending Physician: Benjy Martinez MD

## 2019-04-17 NOTE — PROGRESS NOTES
Problem: Cath Lab Procedures: Post-Cath Day of Procedure (Initiate SCIP Measures for Post-Op Care)  Goal: Activity/Safety  Outcome: Progressing Towards Goal  Note:   Patient is up with stand by assist only at this time    Goal: Respiratory  Outcome: Progressing Towards Goal  Goal: *Procedure site is without bleeding and signs of infection six hours post sheath removal  Outcome: Progressing Towards Goal  Note:   Patient right wrist show no s/s of hematoma or bleeding    Goal: *Hemodynamically stable  Outcome: Progressing Towards Goal  Note:   Patient blood pressure is wnl  Goal: *Optimal pain control at patient's stated goal  Outcome: Progressing Towards Goal  Note:   Patient pain is controled at this time       Problem: Falls - Risk of  Goal: *Absence of Falls  Description  Document Edgar Brunner Fall Risk and appropriate interventions in the flowsheet.   Outcome: Progressing Towards Goal  Note:   Fall Risk Interventions:   Medication Interventions: Evaluate medications/consider consulting pharmacy, Patient to call before getting OOB, Teach patient to arise slowly

## 2019-04-17 NOTE — DISCHARGE SUMMARY
Hospitalist Discharge Summary     Patient ID:    Yajaira Van  [id number]  45 y.o.  1980    Admit date: 4/15/2019    Discharge date and time: 4/17/2019    Admission Diagnoses: NSTEMI (non-ST elevated myocardial infarction) [facility]) [I21.4]    Chronic Diagnoses:    Problem List as of 4/17/2019 Date Reviewed: 4/17/2019          Codes Class Noted - Resolved    * (Principal) NSTEMI (non-ST elevated myocardial infarction) ([address] ICD-10-CM: I21.4  ICD-9-CM: 410.70  4/15/2019 - Present        High cholesterol (Chronic) ICD-10-CM: E78.00  ICD-9-CM: 272.0  4/15/2019 - Present        Tobacco abuse (Chronic) ICD-10-CM: Z72.0  ICD-9-CM: 305.1  4/15/2019 - Present        Leukocytosis ICD-10-CM: D72.829  ICD-9-CM: 288.60  4/15/2019 - Present              Discharge Medications:   Current Discharge Medication List      START taking these medications    Details   atorvastatin (LIPITOR) 40 mg tablet Take 1 Tab by mouth nightly. Qty: 30 Tab, Refills: 3      metoprolol tartrate (LOPRESSOR) 25 mg tablet Take 1 Tab by mouth every twelve (12) hours. Qty: 60 Tab, Refills: 3      !! ticagrelor (BRILINTA) 90 mg tablet Take 1 Tab by mouth two (2) times a day. Qty: 60 Tab, Refills: 11      !! ticagrelor (BRILINTA) 90 mg tablet Take 1 Tab by mouth every twelve (12) hours. Qty: 60 Tab, Refills: 11       !! - Potential duplicate medications found. Please discuss with provider. CONTINUE these medications which have NOT CHANGED    Details   levothyroxine (SYNTHROID) 125 mcg tablet Take 125 mcg by mouth Daily (before breakfast). acetaminophen (TYLENOL) 325 mg tablet Take 325 mg by mouth every six (6) hours as needed for Pain. STOP taking these medications       pravastatin (PRAVACHOL) 20 mg tablet Comments:   Reason for Stopping: Follow up Care:    1. Kevyn Higginbotham MD in 1-2 weeks  2. Cardiology     Diet:  Cardiac Diet    Disposition:  Home.     Advanced Directive:    Discharge Exam:  See today's note.    CONSULTATIONS: Cardiology    Significant Diagnostic Studies:   Recent Labs     04/17/19  0534 04/16/19  0539   WBC 12.2* 10.9   HGB 15.8 15.7   HCT 48.1 47.8    281     Recent Labs     04/17/19  0534 04/16/19  0539 04/15/19  1827    134* 137   K 4.0 3.8 3.6    103 103   CO2 25 25 28   BUN 8 8 10   CREA 0.88 0.84 1.00   * 198* 118*   CA 8.8 8.9 8.8   MG  --  2.1  --    PHOS  --  3.9  --      Recent Labs     04/15/19  1827   SGOT 53*   ALT 57   AP 62   TBILI 0.4   TP 7.8   ALB 4.3   GLOB 3.5     Recent Labs     04/16/19  0232 04/15/19  2059   APTT 42.3* 27.7      No results for input(s): FE, TIBC, PSAT, FERR in the last 72 hours. No results for input(s): PH, PCO2, PO2 in the last 72 hours. Recent Labs     04/16/19  0539 04/15/19  2343   * 368*     No results found for: [address]:   1.  NSTEMI (non-ST elevated myocardial infarction) ([facility] [address] (4/15/2019). S/p cardiac cath and stent. Echocardiogram is normal. Started on brilinta and metoprolol. allergy to ASA          2.  High cholesterol (4/15/2019). On statin      3.  Leukocytosis (4/15/2019). Likely due to acute stress.                   4. Tobacco abuse (4/15/2019).  Counseled on cessation    Discharged in improved condition      Signed:  Chantel Rodriguez MD  4/17/2019  9:39 AM

## 2019-04-17 NOTE — PROGRESS NOTES
Patient now stepdown and has no critical care issues at this time. We will sign off and be available as needed. Pleas call with questions.

## 2019-04-22 ENCOUNTER — OFFICE VISIT (OUTPATIENT)
Dept: CARDIOLOGY CLINIC | Age: 39
End: 2019-04-22

## 2019-04-22 VITALS
WEIGHT: 217.2 LBS | RESPIRATION RATE: 18 BRPM | HEART RATE: 75 BPM | OXYGEN SATURATION: 98 % | HEIGHT: 71 IN | DIASTOLIC BLOOD PRESSURE: 80 MMHG | SYSTOLIC BLOOD PRESSURE: 118 MMHG | BODY MASS INDEX: 30.41 KG/M2

## 2019-04-22 DIAGNOSIS — I25.10 CORONARY ARTERY DISEASE INVOLVING NATIVE CORONARY ARTERY OF NATIVE HEART WITHOUT ANGINA PECTORIS: Primary | ICD-10-CM

## 2019-04-22 DIAGNOSIS — E78.5 HYPERLIPIDEMIA, UNSPECIFIED HYPERLIPIDEMIA TYPE: ICD-10-CM

## 2019-04-22 RX ORDER — NITROGLYCERIN 0.4 MG/1
TABLET SUBLINGUAL
COMMUNITY
End: 2022-04-22

## 2019-04-22 NOTE — PROGRESS NOTES
Chief Complaint   Patient presents with   [facility] Follow Up     Patient presents today for a hospital follow up 4/15 to 4/17 for NSTEMI     Visit Vitals  /80 (BP 1 Location: Left arm, BP Patient Position: Sitting)   Pulse 75   Resp 18   Ht 5' 11\" (1.803 m)   Wt 217 lb 3.2 oz (98.5 kg)   SpO2 98%   BMI 30.29 kg/m²     Chest pain - denied  SOB - denied  Dizziness - denied  Swelling/Edema - denied  Recent hospital visit 4/15/19 to 4/17 NSTEMI  Refills - none at this time    Patient is feeling much better at times he has some chest tightness.

## 2019-04-22 NOTE — PROGRESS NOTES
Destinee Louise MD    [address] [place], [address] [facility]    Office [phone],IAD [phone]  Pager [phone]    Cherelle Patle is a 45 y.o. male is here for f/u visit. Primary care physician:  Jackie Villalpando MD    Patient Active Problem List   Diagnosis Code    NSTEMI (non-ST elevated myocardial infarction) ([facility] [address] I21.4    High cholesterol E78.00    Tobacco abuse Z72.0    Leukocytosis D72.829       Dear Dr. Rojelio Shannon,    I had the pleasure of seeing Mr. Cherelle Patel in the office today. Chief complaint:  Chief Complaint   Patient presents with   [facility] Follow Up     Patient presents today for a hospital follow up 4/15 to 4/17 for NSTEMI       Assessment:    CAD-N STEMI 4/16/19-status post PCI to D2  Smoker-quit recently  HLD        Plan:     Doing well. Continue medications. His insurance is not approved at [facility]. We will refer him to another cardiologist where his insurance is accepted. He has the number and he will call him for an appointment. Aggressive cardiovascular risk factor modification. Patient understands the plan. All questions were answered to the patient's satisfaction. Medication Side Effects and Warnings were discussed with patient: yes  Patient Labs were reviewed and or requested:  yes  Patient Past Records were reviewed and or requested: yes    I appreciate the opportunity to be involved in . See note below for details. Please do not hesitate to contact us with questions or concerns. Destinee Louise MD    Cardiac Testing/ Procedures: A. Cardiac Cath/PCI: 4/16/19 R Radial access  LCA - 3 DRC ( was trying to engage RCA); RCA - AL1     L Main: Nml     LAD: Med size; 30%; D1 - small 40%; D2 - small to med - Ostial 50%; Prox 95%;  TIMi1 flow     LCflex: Med; MLI; OM1- small ; OM2 - med;  ( Bifurcating) - MLI     RCA: Dominant; Med to Large; MLI     LVEDP: Nml     LVEF: Not assessed; Nml by echo     No significant gradient across aortic valve.     PCI: EBU 3 guide; D2 - 95%  D2 wired with BMS  LAD wired with Runthrough     D2 - Pre dil with 2 by 12  CRISTINO 2by 15  Post dil with 2.25 by 8  JOSH 1 before ; Ryanted Ast after  95% before; 0% after           Specimens Removed : None     Closure Device: TR band           See full cath note.     Complications: none        B.ECHO/JOSLYN:    C.StressNuclear/Stress ECHO/Stress test:    D.Vascular:    E. EP:    F. Miscellaneous:    Subjective:  Stephanie Dan is a 45 y.o. male who returns for follow up  Visit. Recent hospital admission on 4/16/19 for N STEMI-status post PCI. No chest pain, dyspnea, palpitations. Tolerating medications. ROS:  (bold if positive, if negative)             Medications before admission:    Current Outpatient Medications   Medication Sig Dispense    atorvastatin (LIPITOR) 40 mg tablet Take 1 Tab by mouth nightly. 30 Tab    metoprolol tartrate (LOPRESSOR) 25 mg tablet Take 1 Tab by mouth every twelve (12) hours. 60 Tab    ticagrelor (BRILINTA) 90 mg tablet Take 1 Tab by mouth two (2) times a day. 60 Tab    levothyroxine (SYNTHROID) 125 mcg tablet Take 125 mcg by mouth Daily (before breakfast).  nitroglycerin (NITROSTAT) 0.4 mg SL tablet nitroglycerin 0.4 mg sublingual tablet   give 0.4 mg PO now     acetaminophen (TYLENOL) 325 mg tablet Take 325 mg by mouth every six (6) hours as needed for Pain. No current facility-administered medications for this visit.         Family History of CAD:    No    Social History:  Current  Smoker  No    Physical Exam:  Visit Vitals  /80 (BP 1 Location: Left arm, BP Patient Position: Sitting)   Pulse 75   Resp 18   Ht 5' 11\" (1.803 m)   Wt 217 lb 3.2 oz (98.5 kg)   SpO2 98%   BMI 30.29 kg/m²          Gen: Well-developed, well-nourished, in no acute distress  Neck: Supple,No JVD, No Carotid Bruit,   Resp: No accessory muscle use, Clear breath sounds, No rales or rhonchi  Card: Regular Rate,Rythm,Normal S1, S2, No murmurs, rubs or gallop. No thrills.    Abd:  Soft, non-tender, non-distended,BS+,   MSK: No cyanosis  Skin: No rashes    Neuro: moving all four extremities , follows commands appropriately  Psych:  Good insight, oriented to person, place , alert, Nml Affect  LE: No edema    EKG: NSR/Nml axis/NSTT      LABS:        Lab Results   Component Value Date/Time    WBC 12.2 (H) 04/17/2019 05:34 AM    HGB 15.8 04/17/2019 05:34 AM    HCT 48.1 04/17/2019 05:34 AM    PLATELET 888 10/66/1964 05:34 AM     Lab Results   Component Value Date/Time    Sodium 137 04/17/2019 05:34 AM    Potassium 4.0 04/17/2019 05:34 AM    Chloride 107 04/17/2019 05:34 AM    CO2 25 04/17/2019 05:34 AM    Anion gap 5 04/17/2019 05:34 AM    Glucose 121 (H) 04/17/2019 05:34 AM    BUN 8 04/17/2019 05:34 AM    Creatinine 0.88 04/17/2019 05:34 AM    BUN/Creatinine ratio 9 (L) 04/17/2019 05:34 AM    GFR est AA >60 04/17/2019 05:34 AM    GFR est non-AA >60 04/17/2019 05:34 AM    Calcium 8.8 04/17/2019 05:34 AM       Lab Results   Component Value Date/Time    aPTT 42.3 (H) 04/16/2019 02:32 AM     No results found for: INR, PTMR, PTP, PT1, PT2  No components found for: Isa Luna MD

## 2019-05-03 ENCOUNTER — HOSPITAL ENCOUNTER (EMERGENCY)
Age: 39
Discharge: HOME | End: 2019-05-03
Attending: EMERGENCY MEDICINE
Payer: COMMERCIAL

## 2019-05-03 ENCOUNTER — APPOINTMENT (OUTPATIENT)
Dept: GENERAL RADIOLOGY | Age: 39
End: 2019-05-03
Attending: EMERGENCY MEDICINE
Payer: COMMERCIAL

## 2019-05-03 VITALS
DIASTOLIC BLOOD PRESSURE: 97 MMHG | OXYGEN SATURATION: 100 % | HEART RATE: 59 BPM | TEMPERATURE: 97.5 F | SYSTOLIC BLOOD PRESSURE: 129 MMHG | HEIGHT: 71 IN | BODY MASS INDEX: 30.38 KG/M2 | WEIGHT: 217 LBS | RESPIRATION RATE: 19 BRPM

## 2019-05-03 DIAGNOSIS — R07.9 CHEST PAIN, UNSPECIFIED TYPE: Primary | ICD-10-CM

## 2019-05-03 LAB
ALBUMIN SERPL-MCNC: 4.2 G/DL (ref 3.5–5)
ALBUMIN/GLOB SERPL: 1.3 {RATIO} (ref 1.1–2.2)
ALP SERPL-CCNC: 70 U/L (ref 45–117)
ALT SERPL-CCNC: 49 U/L (ref 12–78)
ANION GAP SERPL CALC-SCNC: 5 MMOL/L (ref 5–15)
AST SERPL-CCNC: 27 U/L (ref 15–37)
ATRIAL RATE: 62 BPM
BASOPHILS # BLD: 0.1 K/UL (ref 0–0.1)
BASOPHILS NFR BLD: 1 % (ref 0–1)
BILIRUB SERPL-MCNC: 0.4 MG/DL (ref 0.2–1)
BNP SERPL-MCNC: 85 PG/ML
BUN SERPL-MCNC: 18 MG/DL (ref 6–20)
BUN/CREAT SERPL: 20 (ref 12–20)
CALCIUM SERPL-MCNC: 8.8 MG/DL (ref 8.5–10.1)
CALCULATED P AXIS, ECG09: 53 DEGREES
CALCULATED R AXIS, ECG10: 75 DEGREES
CALCULATED T AXIS, ECG11: 68 DEGREES
CHLORIDE SERPL-SCNC: 107 MMOL/L (ref 97–108)
CO2 SERPL-SCNC: 27 MMOL/L (ref 21–32)
CREAT SERPL-MCNC: 0.89 MG/DL (ref 0.7–1.3)
D DIMER PPP FEU-MCNC: <0.19 MG/L FEU (ref 0–0.65)
DIAGNOSIS, 93000: NORMAL
DIFFERENTIAL METHOD BLD: ABNORMAL
EOSINOPHIL # BLD: 0.5 K/UL (ref 0–0.4)
EOSINOPHIL NFR BLD: 5 % (ref 0–7)
ERYTHROCYTE [DISTWIDTH] IN BLOOD BY AUTOMATED COUNT: 11.8 % (ref 11.5–14.5)
GLOBULIN SER CALC-MCNC: 3.3 G/DL (ref 2–4)
GLUCOSE SERPL-MCNC: 101 MG/DL (ref 65–100)
HCT VFR BLD AUTO: 43.7 % (ref 36.6–50.3)
HGB BLD-MCNC: 14.6 G/DL (ref 12.1–17)
IMM GRANULOCYTES # BLD AUTO: 0 K/UL (ref 0–0.04)
IMM GRANULOCYTES NFR BLD AUTO: 0 % (ref 0–0.5)
LIPASE SERPL-CCNC: 217 U/L (ref 73–393)
LYMPHOCYTES # BLD: 2.6 K/UL (ref 0.8–3.5)
LYMPHOCYTES NFR BLD: 26 % (ref 12–49)
MCH RBC QN AUTO: 31 PG (ref 26–34)
MCHC RBC AUTO-ENTMCNC: 33.4 G/DL (ref 30–36.5)
MCV RBC AUTO: 92.8 FL (ref 80–99)
MONOCYTES # BLD: 0.8 K/UL (ref 0–1)
MONOCYTES NFR BLD: 8 % (ref 5–13)
NEUTS SEG # BLD: 6 K/UL (ref 1.8–8)
NEUTS SEG NFR BLD: 60 % (ref 32–75)
NRBC # BLD: 0 K/UL (ref 0–0.01)
NRBC BLD-RTO: 0 PER 100 WBC
P-R INTERVAL, ECG05: 162 MS
PLATELET # BLD AUTO: 266 K/UL (ref 150–400)
PMV BLD AUTO: 9.8 FL (ref 8.9–12.9)
POTASSIUM SERPL-SCNC: 4.2 MMOL/L (ref 3.5–5.1)
PROT SERPL-MCNC: 7.5 G/DL (ref 6.4–8.2)
Q-T INTERVAL, ECG07: 392 MS
QRS DURATION, ECG06: 108 MS
QTC CALCULATION (BEZET), ECG08: 397 MS
RBC # BLD AUTO: 4.71 M/UL (ref 4.1–5.7)
SODIUM SERPL-SCNC: 139 MMOL/L (ref 136–145)
TROPONIN I SERPL-MCNC: <0.05 NG/ML
TROPONIN I SERPL-MCNC: <0.05 NG/ML
VENTRICULAR RATE, ECG03: 62 BPM
WBC # BLD AUTO: 10.1 K/UL (ref 4.1–11.1)

## 2019-05-03 PROCEDURE — 80053 COMPREHEN METABOLIC PANEL: CPT

## 2019-05-03 PROCEDURE — 85379 FIBRIN DEGRADATION QUANT: CPT

## 2019-05-03 PROCEDURE — 83880 ASSAY OF NATRIURETIC PEPTIDE: CPT

## 2019-05-03 PROCEDURE — 85025 COMPLETE CBC W/AUTO DIFF WBC: CPT

## 2019-05-03 PROCEDURE — 71046 X-RAY EXAM CHEST 2 VIEWS: CPT

## 2019-05-03 PROCEDURE — 36415 COLL VENOUS BLD VENIPUNCTURE: CPT

## 2019-05-03 PROCEDURE — 83690 ASSAY OF LIPASE: CPT

## 2019-05-03 PROCEDURE — 74011250637 HC RX REV CODE- 250/637: Performed by: EMERGENCY MEDICINE

## 2019-05-03 PROCEDURE — 93005 ELECTROCARDIOGRAM TRACING: CPT

## 2019-05-03 PROCEDURE — 99285 EMERGENCY DEPT VISIT HI MDM: CPT

## 2019-05-03 PROCEDURE — 84484 ASSAY OF TROPONIN QUANT: CPT

## 2019-05-03 RX ORDER — ACETAMINOPHEN 500 MG
1000 TABLET ORAL ONCE
Status: COMPLETED | OUTPATIENT
Start: 2019-05-03 | End: 2019-05-03

## 2019-05-03 RX ADMIN — ACETAMINOPHEN 1000 MG: 500 TABLET ORAL at 11:53

## 2019-05-03 NOTE — DISCHARGE INSTRUCTIONS

## 2019-05-03 NOTE — ED PROVIDER NOTES
45 y.o. male with past medical history significant for NSTEMI, high cholesterol, and tobacco abuse who presents from work with chief complaint of SOB. Pt states that he had stents placed with Dr. Hardy Velez 2 weeks ago. He began experiencing posterior R-shoulder pain 5 days ago and f/u with his PCP who told him that he didn't think the pain was related. He then began experiencing SOB last night which worsened today, and he states feels like he is having difficulty taking a deep breath. Pt took 2 Tylenol yesterday for the shoulder pain with some relief. He has been taking Metoprolol, Lipitor, Nitrostat, Brilinta, and Synthroid daily. Pt notes that he has an appointment with Dr. Ricardo Milton next week. Pt reports an allergy to ASA. Pt denies leg pain, leg swelling, abd pain, nausea, and vomiting. There are no other acute medical concerns at this time. Chart Review: Pt was admitted 4/15/19-4/17/19 for NSTEMI s/p cardiac cath and stent placement. Echo was normal. He was discharged home on Lipitor, Metoprolol Brilinta    Social hx: hasn't smoked in 18 days since before the surgery  PCP: Rose Zacarias MD    Note written by Malcolm Wheatley, as dictated by Haim Shah MD 10:53 AM      The history is provided by the patient. No  was used. Past Medical History:   Diagnosis Date    High cholesterol     NSTEMI (non-ST elevated myocardial infarction) ([facility] [address] 04/15/2019    Tobacco abuse 4/15/2019       No past surgical history on file.       Family History:   Problem Relation Age of Onset    High Cholesterol Mother     Hypertension Mother     Diabetes Other        Social History     Socioeconomic History    Marital status:      Spouse name: Not on file    Number of children: Not on file    Years of education: Not on file    Highest education level: Not on file   Occupational History    Not on file   Social Needs    Financial resource strain: Not on file   Patricio-Salvador insecurity:     Worry: Not on file     Inability: Not on file    Transportation needs:     Medical: Not on file     Non-medical: Not on file   Tobacco Use    Smoking status: Former Smoker    Smokeless tobacco: Former User   Substance and Sexual Activity    Alcohol use: Not on file    Drug use: Not on file    Sexual activity: Not on file   Lifestyle    Physical activity:     Days per week: Not on file     Minutes per session: Not on file    Stress: Not on file   Relationships    Social connections:     Talks on phone: Not on file     Gets together: Not on file     Attends Pentecostalism service: Not on file     Active member of club or organization: Not on file     Attends meetings of clubs or organizations: Not on file     Relationship status: Not on file    Intimate partner violence:     Fear of current or ex partner: Not on file     Emotionally abused: Not on file     Physically abused: Not on file     Forced sexual activity: Not on file   Other Topics Concern    Not on file   Social History Narrative    Not on file         ALLERGIES: Aspirin    Review of Systems   Constitutional: Negative for activity change, chills and fever. HENT: Negative for nosebleeds, sore throat, trouble swallowing and voice change. Eyes: Negative for visual disturbance. Respiratory: Positive for shortness of breath. Cardiovascular: Negative for chest pain and palpitations. Gastrointestinal: Negative for abdominal pain, constipation, diarrhea and nausea. Genitourinary: Negative for difficulty urinating, dysuria, hematuria and urgency. Musculoskeletal: Positive for arthralgias. Negative for back pain, neck pain and neck stiffness. Skin: Negative for color change. Allergic/Immunologic: Negative for immunocompromised state. Neurological: Negative for dizziness, seizures, syncope, weakness, light-headedness, numbness and headaches.    Psychiatric/Behavioral: Negative for behavioral problems, confusion, hallucinations, self-injury and suicidal ideas. All other systems reviewed and are negative. Vitals:    05/03/19 1054   BP: (!) 127/92   Pulse: 75   Resp: 20   Temp: 97.5 °F (36.4 °C)   SpO2: 98%   Weight: 98.4 kg (217 lb)   Height: 5' 11\" (1.803 m)            Physical Exam   Constitutional: He is oriented to person, place, and time. He appears well-developed and well-nourished. No distress. HENT:   Head: Normocephalic and atraumatic. Eyes: Pupils are equal, round, and reactive to light. Neck: Normal range of motion. Neck supple. Cardiovascular: Normal rate, regular rhythm and normal heart sounds. Exam reveals no gallop and no friction rub. No murmur heard. Pulmonary/Chest: Effort normal and breath sounds normal. No respiratory distress. He has no wheezes. Abdominal: Soft. Bowel sounds are normal. He exhibits no distension. There is no tenderness. There is no rebound and no guarding. Musculoskeletal: Normal range of motion. Right trapezius muscle point tenderness. Neurological: He is alert and oriented to person, place, and time. Skin: Skin is warm. No rash noted. He is not diaphoretic. Psychiatric: He has a normal mood and affect. His behavior is normal. Judgment and thought content normal.   Nursing note and vitals reviewed. Note written by Malcolm Ingram, as dictated by Pedrito Schroeder MD 10:53 AM     MDM     This is a 38YOM with PMHx, ROS and PE as above presenting with complaints of dyspnea and right shoulder pain, Hx significant for PCI for NSTEMI 2w ago, denies CP, cough, pedal edema, calf pain. States right shoulder pain worse with palpation, states compliant with medications. PE remarkable for a well appearing male in NAD, CTAB, RRR w/o MGR, noted to be normotensive, satting well on RA. Dx includes ACS, biliary colic, PE. Will obtain CBC, CMP, trop, EKG, CXR, dimer, consult with Cards and make a disposition.     Procedures      ED EKG interpretation:  Rhythm: normal sinus rhythm; and regular . Rate (approx.): 62; ST/T wave: upright T waves, no ST changes; no abnormalities. Note written by Malcolm Deleon, as dictated by Yina Theodore MD 11:06 AM    CONSULT NOTE:  12:05 PM Yina Theodore MD spoke with Dr. Baron Ingram, Consult for Cardiology. Discussed available diagnostic tests and clinical findings. Dr. Baron Ingram recommends if the pt's studies are negative he can f/u with Dr. Breanne Mata next week. 2:32 PM  Right shoulder pain improved, neg EKG, lab work and neg trop x2. Per Cards recs will DC home with PCP f/u and Cards next week as scheduled, return precautions given.

## 2019-05-03 NOTE — ED TRIAGE NOTES
For 5 days has had a sharp intermittent sharp pain in right shoulder and then yesterday started to feel short of breath. Recent cardiac stent.

## 2019-05-10 ENCOUNTER — TELEPHONE (OUTPATIENT)
Dept: CARDIAC REHAB | Age: 39
End: 2019-05-10

## 2019-05-10 NOTE — TELEPHONE ENCOUNTER
Cardiac Rehab @ [facility] - 5/10/2019    This patient has been contacted on the following dates to set up Cardiac Rehab.    4/24/2019 - LM with wife asking for a return call. 5/1/2019 - Phone was busy, unable to leave message    5/10/2019 - Unable to leave message. At this point there is no follow up planned.      Thank you for the referral,     Chiquis Rosas

## 2022-03-18 PROBLEM — I21.4 NSTEMI (NON-ST ELEVATED MYOCARDIAL INFARCTION) (HCC): Status: ACTIVE | Noted: 2019-04-15

## 2022-03-19 PROBLEM — Z72.0 TOBACCO ABUSE: Status: ACTIVE | Noted: 2019-04-15

## 2022-03-19 PROBLEM — E78.00 HIGH CHOLESTEROL: Status: ACTIVE | Noted: 2019-04-15

## 2022-03-19 PROBLEM — D72.829 LEUKOCYTOSIS: Status: ACTIVE | Noted: 2019-04-15

## 2022-04-22 ENCOUNTER — OFFICE VISIT (OUTPATIENT)
Dept: FAMILY MEDICINE CLINIC | Age: 42
End: 2022-04-22
Payer: COMMERCIAL

## 2022-04-22 VITALS
DIASTOLIC BLOOD PRESSURE: 79 MMHG | HEIGHT: 71 IN | BODY MASS INDEX: 30.66 KG/M2 | SYSTOLIC BLOOD PRESSURE: 115 MMHG | RESPIRATION RATE: 17 BRPM | WEIGHT: 219 LBS

## 2022-04-22 DIAGNOSIS — I25.10 CORONARY ARTERY DISEASE INVOLVING NATIVE CORONARY ARTERY OF NATIVE HEART WITHOUT ANGINA PECTORIS: Primary | ICD-10-CM

## 2022-04-22 DIAGNOSIS — E03.9 ACQUIRED HYPOTHYROIDISM: ICD-10-CM

## 2022-04-22 DIAGNOSIS — R73.03 PREDIABETES: ICD-10-CM

## 2022-04-22 DIAGNOSIS — Z11.59 NEED FOR HEPATITIS C SCREENING TEST: ICD-10-CM

## 2022-04-22 DIAGNOSIS — Z11.3 ROUTINE SCREENING FOR STI (SEXUALLY TRANSMITTED INFECTION): ICD-10-CM

## 2022-04-22 DIAGNOSIS — Z00.00 ANNUAL PHYSICAL EXAM: ICD-10-CM

## 2022-04-22 PROCEDURE — 99386 PREV VISIT NEW AGE 40-64: CPT | Performed by: STUDENT IN AN ORGANIZED HEALTH CARE EDUCATION/TRAINING PROGRAM

## 2022-04-22 PROCEDURE — 99203 OFFICE O/P NEW LOW 30 MIN: CPT | Performed by: STUDENT IN AN ORGANIZED HEALTH CARE EDUCATION/TRAINING PROGRAM

## 2022-04-22 RX ORDER — METOPROLOL TARTRATE 25 MG/1
25 TABLET, FILM COATED ORAL EVERY 12 HOURS
Qty: 60 TABLET | Refills: 3 | Status: SHIPPED | OUTPATIENT
Start: 2022-04-22 | End: 2022-09-14

## 2022-04-22 RX ORDER — LEVOTHYROXINE SODIUM 150 UG/1
150 TABLET ORAL
Qty: 30 TABLET | Refills: 0 | Status: SHIPPED | OUTPATIENT
Start: 2022-04-22 | End: 2022-05-24

## 2022-04-22 RX ORDER — LEVOTHYROXINE SODIUM 150 UG/1
150 TABLET ORAL
COMMUNITY
End: 2022-04-22 | Stop reason: SDUPTHER

## 2022-04-22 RX ORDER — CLOPIDOGREL BISULFATE 75 MG/1
75 TABLET ORAL DAILY
COMMUNITY
End: 2022-09-30 | Stop reason: SDUPTHER

## 2022-04-22 NOTE — PATIENT INSTRUCTIONS
Learning About Coronary Artery Disease (CAD)  What is coronary artery disease? Coronary artery disease is a condition that occurs when plaque builds up in the arteries that bring oxygen-rich blood to your heart. Plaque is a fatty substance made of cholesterol, calcium, and other substances in the blood. This process is called hardening of the arteries, or atherosclerosis. What happens when you have coronary artery disease? · Plaque may narrow the coronary arteries. Narrowed arteries cause poor blood flow. This can lead to angina symptoms such as chest pain or discomfort. If blood flow is completely blocked, you could have a heart attack. · You can slow and reduce the risk of future problems by making changes in your lifestyle. These include quitting smoking and eating heart-healthy foods. · Treatment, along with changes in your lifestyle, can help you live a longer and healthier life. How can you prevent coronary artery disease? · Do not smoke. It may be the best thing you can do to prevent coronary artery disease. If you need help quitting, talk to your doctor about stop-smoking programs and medicines. These can increase your chances of quitting for good. · Be active. Try to do moderate activity at least 2½ hours a week. Or try to do vigorous activity at least 1¼ hours a week. You may want to walk or try other activities, such as running, swimming, cycling, or playing tennis or team sports. · Eat heart-healthy foods. Eat more fruits and vegetables and less food that contains saturated and trans fats. Limit alcohol, sodium, and sweets. · Stay at a healthy weight. Lose weight if you need to. · Manage other health problems such as diabetes, high blood pressure, and high cholesterol. How is coronary artery disease treated? · Your doctor will suggest that you make lifestyle changes. For example, your doctor may ask you to eat healthy foods, quit smoking, lose extra weight, and be more active.   · You will take medicines that help prevent a heart attack. · Your doctor may suggest a procedure to open narrowed or blocked arteries. This is called angioplasty. Or your doctor may suggest using healthy blood vessels to create detours around narrowed or blocked arteries. This is called bypass surgery. Follow-up care is a key part of your treatment and safety. Be sure to make and go to all appointments, and call your doctor if you are having problems. It's also a good idea to know your test results and keep a list of the medicines you take. Where can you learn more? Go to http://www.gray.com/  Enter C643 in the search box to learn more about \"Learning About Coronary Artery Disease (CAD). \"  Current as of: January 10, 2022               Content Version: 13.2  © 2006-2022 [employer]. Care instructions adapted under license by [employer] (which disclaims liability or warranty for this information). If you have questions about a medical condition or this instruction, always ask your healthcare professional. [place] [address] any warranty or liability for your use of this information. Learning About the [address] Diet  What is the Mediterranean diet? The Mediterranean diet is a style of eating rather than a diet plan. It features foods eaten in [place], [place], [place] and [place], and other countries along the [facility]. It emphasizes eating foods like fish, fruits, vegetables, beans, high-fiber breads and whole grains, nuts, and olive oil. This style of eating includes limited red meat, cheese, and sweets. Why choose the Mediterranean diet? A Mediterranean-style diet may improve heart health. It contains more fat than other heart-healthy diets. But the fats are mainly from nuts, unsaturated oils (such as fish oils and olive oil), and certain nut or seed oils (such as canola, soybean, or flaxseed oil).  These fats may help protect the heart and blood vessels. How can you get started on the Mediterranean diet? Here are some things you can do to switch to a more Mediterranean way of eating. What to eat  · Eat a variety of fruits and vegetables each day, such as grapes, blueberries, tomatoes, broccoli, peppers, figs, olives, spinach, eggplant, beans, lentils, and chickpeas. · Eat a variety of whole-grain foods each day, such as oats, brown rice, and whole wheat bread, pasta, and couscous. · Eat fish at least 2 times a week. Try tuna, salmon, mackerel, lake trout, herring, or sardines. · Eat moderate amounts of low-fat dairy products, such as milk, cheese, or yogurt. · Eat moderate amounts of poultry and eggs. · Choose healthy (unsaturated) fats, such as nuts, olive oil, and certain nut or seed oils like canola, soybean, and flaxseed. · Limit unhealthy (saturated) fats, such as butter, palm oil, and coconut oil. And limit fats found in animal products, such as meat and dairy products made with whole milk. Try to eat red meat only a few times a month in very small amounts. · Limit sweets and desserts to only a few times a week. This includes sugar-sweetened drinks like soda. The Mediterranean diet may also include red wine with your meal--1 glass each day for women and up to 2 glasses a day for men. Tips for eating at home  · Use herbs, spices, garlic, lemon zest, and citrus juice instead of salt to add flavor to foods. · Add avocado slices to your sandwich instead of cramer. · Have fish for lunch or dinner instead of red meat. Brush the fish with olive oil, and broil or grill it. · Sprinkle your salad with seeds or nuts instead of cheese. · Cook with olive or canola oil instead of butter or oils that are high in saturated fat. · Switch from 2% milk or whole milk to 1% or fat-free milk.   · Dip raw vegetables in a vinaigrette dressing or hummus instead of dips made from mayonnaise or sour cream.  · Have a piece of fruit for dessert instead of a piece of cake. Try baked apples, or have some dried fruit. Tips for eating out  · Try broiled, grilled, baked, or poached fish instead of having it fried or breaded. · Ask your  to have your meals prepared with olive oil instead of butter. · Order dishes made with marinara sauce or sauces made from olive oil. Avoid sauces made from cream or mayonnaise. · Choose whole-grain breads, whole wheat pasta and pizza crust, brown rice, beans, and lentils. · Cut back on butter or margarine on bread. Instead, you can dip your bread in a small amount of olive oil. · Ask for a side salad or grilled vegetables instead of french fries or chips. Where can you learn more? Go to http://www.thompson.com/  Enter O407 in the search box to learn more about \"Learning About the Mediterranean Diet. \"  Current as of: September 8, 2021               Content Version: 13.2  © 2006-2022 Healthwise, [facility]. Care instructions adapted under license by [employer] (which disclaims liability or warranty for this information). If you have questions about a medical condition or this instruction, always ask your healthcare professional. [address] any warranty or liability for your use of this information.

## 2022-04-22 NOTE — PROGRESS NOTES
Subjective   CC:  Johnnie Fontaine is a 39 y.o. male who presents to establish care. Previous PCP: Dr. Becca Horner, last saw PCP 1 yr ago    CAD s/p stent in 2019:  - F/b [facility]  - Meds: Plavix, Metoprolol, Atorvastatin  - Initial symptoms when he had NSTEMI: right arm and shoulder pain  - Exercise: has been working out for 3 months now - does 10 min of jump roping every other day, then burpees, jumping jacks, upper body (body weight)  - Diet: has been improving his diet over the past 3 months - avoids fried foods, low carb, salads, chicken, fish, lentils, boiled eggs   - Drinks half a cup of black coffee every morning and water otherwise   - Drinks 3-5 glasses of scotch every week    Hypothyroidism:   - Dx'ed 7-8 years ago  - Initial symptoms: lethargy  - Taking Levothyroxine 150 mcg daily as Rx'ed    Pre-DM:   - Unsure of last A1c  - Strong fh/o DM  - On low-carb diet    Review of Systems   Constitutional: Negative for chills and fever. HENT: Negative for congestion, sinus pain and sore throat. Respiratory: Negative for cough and shortness of breath. Cardiovascular: Negative for chest pain and palpitations. Gastrointestinal: Negative for abdominal pain, constipation, diarrhea, nausea and vomiting. Musculoskeletal: Negative for back pain and neck pain. Neurological: Negative for dizziness and headaches. Past Medical History  Past Medical History:   Diagnosis Date    High cholesterol     [facility]    Thyroid disease     Tobacco abuse 4/15/2019       Current Medications  Current Outpatient Medications   Medication Sig Dispense Refill    clopidogreL (Plavix) 75 mg tab Take  by mouth.  levothyroxine (SYNTHROID) 150 mcg tablet Take 1 Tablet by mouth Daily (before breakfast). 30 Tablet 0    metoprolol tartrate (LOPRESSOR) 25 mg tablet Take 1 Tablet by mouth every twelve (12) hours.  60 Tablet 3    atorvastatin (LIPITOR) 40 mg tablet Take 1 Tab by mouth nightly. 30 Tab 3       Allergies  Allergies   Allergen Reactions    Aspirin Hives       Social History   Social History     Socioeconomic History    Marital status:      Spouse name: Not on file    Number of children: Not on file    Years of education: Not on file    Highest education level: Not on file   Occupational History    Not on file   Tobacco Use    Smoking status: Former Smoker     Packs/day: 1.50     Years: 3.00     Pack years: 4.50     Quit date: 2019     Years since quitting: 3.3    Smokeless tobacco: Never Used   Substance and Sexual Activity    Alcohol use: Not Currently     Alcohol/week: 5.0 standard drinks     Types: 5 Shots of liquor per week    Drug use: Never    Sexual activity: Not on file   Other Topics Concern    Not on file   Social History Narrative    Not on file     Social Determinants of Health     Financial Resource Strain:     Difficulty of Paying Living Expenses: Not on file   Food Insecurity:     Worried About [address] in the Last Year: Not on file    [address] in the Last Year: Not on file   Transportation Needs:     Lack of Transportation (Medical): Not on file    Lack of Transportation (Non-Medical):  Not on file   Physical Activity:     Days of Exercise per Week: Not on file    Minutes of Exercise per Session: Not on file   Stress:     Feeling of Stress : Not on file   Social Connections:     Frequency of Communication with Friends and Family: Not on file    Frequency of Social Gatherings with Friends and Family: Not on file    Attends Confucianism Services: Not on file    Active Member of Clubs or Organizations: Not on file    Attends Club or Organization Meetings: Not on file    Marital Status: Not on file   Intimate Partner Violence:     Fear of Current or Ex-Partner: Not on file    Emotionally Abused: Not on file    Physically Abused: Not on file    Sexually Abused: Not on file Housing Stability:     Unable to Pay for Housing in the Last Year: Not on file    Number of Places Lived in the Last Year: Not on file    Unstable Housing in the Last Year: Not on file       Family History  Family History   Problem Relation Age of Onset    High Cholesterol Mother     Hypertension Mother     Diabetes Mother     Depression Mother     Diabetes Other     Diabetes Father     No Known Problems Sister     No Known Problems Maternal Grandmother     Heart Attack Maternal Grandfather     No Known Problems Paternal Grandmother     No Known Problems Paternal Grandfather        Objective   Vital Signs  Visit Vitals  /79   Pulse (P) 64   Temp (P) 97.3 °F (36.3 °C) (Temporal)   Resp 17   Ht 5' 11\" (1.803 m)   Wt 219 lb (99.3 kg)   SpO2 (P) 97%   BMI 30.54 kg/m²       Physical Examination  Physical Exam  Constitutional:       General: He is not in acute distress. Appearance: Normal appearance. HENT:      Head: Normocephalic and atraumatic. Eyes:      Extraocular Movements: Extraocular movements intact. Conjunctiva/sclera: Conjunctivae normal.   Cardiovascular:      Rate and Rhythm: Normal rate and regular rhythm. Pulmonary:      Effort: Pulmonary effort is normal.      Breath sounds: Normal breath sounds. Abdominal:      General: Abdomen is flat. Palpations: Abdomen is soft. Musculoskeletal:      Right lower leg: No edema. Left lower leg: No edema. Skin:     General: Skin is warm. Findings: No rash. Neurological:      General: No focal deficit present. Mental Status: He is alert and oriented to person, place, and time. Assessment and Plan   Melanie Sarah is a 39 y.o. who is here to establish care. 1. Coronary artery disease involving native coronary artery of native heart without angina pectoris  F/b [facility]. Will have pt sign records release form at next visit. - METABOLIC PANEL, COMPREHENSIVE; Future  - LIPID PANEL;  Future  - HEMOGLOBIN A1C WITH EAG; Future  - MICROALBUMIN, UR, RAND W/ MICROALB/CREAT RATIO; Future  - metoprolol tartrate (LOPRESSOR) 25 mg tablet; Take 1 Tablet by mouth every twelve (12) hours. Dispense: 60 Tablet; Refill: 3    2. Acquired hypothyroidism  - TSH 3RD GENERATION; Future  - levothyroxine (SYNTHROID) 150 mcg tablet; Take 1 Tablet by mouth Daily (before breakfast). Dispense: 30 Tablet; Refill: 0    3. Prediabetes  - HEMOGLOBIN A1C WITH EAG; Future    4. Annual physical exam  - CBC W/O DIFF; Future    5. Routine screening for STI (sexually transmitted infection)  - HIV 1/2 AG/AB, 4TH GENERATION,W RFLX CONFIRM; Future  - RPR; Future  - Jacquiline Flax / GC-AMPLIFIED; Future    6. Need for hepatitis C screening test  - HEPATITIS C AB; Future       RTC 3 months    Patient is counseled to return to the office if symptoms do not improve as expected. Urgent consultation with the nearest Emergency Department is strongly recommended if condition worsens. Patient is counseled to follow up as recommended and to inform the office if any changes in treatment are recommended.       I discussed this patient with Dr. Rosemarie Johnson (Attending Physician)       Signed By:  Bina Nolan MD  Family Medicine Resident

## 2022-04-22 NOTE — PROGRESS NOTES
Chief Complaint   Patient presents with   [facility] Establish Care    Thyroid Problem     1. Have you been to the ER, urgent care clinic since your last visit? Hospitalized since your last visit? N/A    2. Have you seen or consulted any other health care providers outside of the [address] [place] [address] since your last visit? Include any pap smears or colon screening.  N/A

## 2022-04-29 ENCOUNTER — LAB ONLY (OUTPATIENT)
Dept: FAMILY MEDICINE CLINIC | Age: 42
End: 2022-04-29

## 2022-04-29 DIAGNOSIS — Z11.3 ROUTINE SCREENING FOR STI (SEXUALLY TRANSMITTED INFECTION): ICD-10-CM

## 2022-04-29 DIAGNOSIS — Z00.00 ANNUAL PHYSICAL EXAM: ICD-10-CM

## 2022-04-29 DIAGNOSIS — E03.9 ACQUIRED HYPOTHYROIDISM: ICD-10-CM

## 2022-04-29 DIAGNOSIS — Z11.59 NEED FOR HEPATITIS C SCREENING TEST: ICD-10-CM

## 2022-04-29 DIAGNOSIS — I25.10 CORONARY ARTERY DISEASE INVOLVING NATIVE CORONARY ARTERY OF NATIVE HEART WITHOUT ANGINA PECTORIS: ICD-10-CM

## 2022-04-29 DIAGNOSIS — R73.03 PREDIABETES: ICD-10-CM

## 2022-04-30 LAB
CREAT UR-MCNC: 159 MG/DL
MICROALBUMIN UR-MCNC: 0.9 MG/DL
MICROALBUMIN/CREAT UR-RTO: 6 MG/G (ref 0–30)

## 2022-05-01 LAB
ALBUMIN SERPL-MCNC: 4.6 G/DL (ref 3.5–5)
ALBUMIN/GLOB SERPL: 1.5 {RATIO} (ref 1.1–2.2)
ALP SERPL-CCNC: 78 U/L (ref 45–117)
ALT SERPL-CCNC: 34 U/L (ref 12–78)
ANION GAP SERPL CALC-SCNC: 4 MMOL/L (ref 5–15)
AST SERPL-CCNC: 22 U/L (ref 15–37)
BILIRUB SERPL-MCNC: 0.6 MG/DL (ref 0.2–1)
BUN SERPL-MCNC: 18 MG/DL (ref 6–20)
BUN/CREAT SERPL: 21 (ref 12–20)
CALCIUM SERPL-MCNC: 9.5 MG/DL (ref 8.5–10.1)
CHLORIDE SERPL-SCNC: 104 MMOL/L (ref 97–108)
CHOLEST SERPL-MCNC: 147 MG/DL
CO2 SERPL-SCNC: 31 MMOL/L (ref 21–32)
CREAT SERPL-MCNC: 0.85 MG/DL (ref 0.7–1.3)
ERYTHROCYTE [DISTWIDTH] IN BLOOD BY AUTOMATED COUNT: 12.9 % (ref 11.5–14.5)
EST. AVERAGE GLUCOSE BLD GHB EST-MCNC: 126 MG/DL
GLOBULIN SER CALC-MCNC: 3.1 G/DL (ref 2–4)
GLUCOSE SERPL-MCNC: 101 MG/DL (ref 65–100)
HBA1C MFR BLD: 6 % (ref 4–5.6)
HCT VFR BLD AUTO: 48.7 % (ref 36.6–50.3)
HCV AB SERPL QL IA: NONREACTIVE
HDLC SERPL-MCNC: 47 MG/DL
HDLC SERPL: 3.1 {RATIO} (ref 0–5)
HGB BLD-MCNC: 15 G/DL (ref 12.1–17)
HIV 1+2 AB+HIV1 P24 AG SERPL QL IA: NONREACTIVE
HIV12 RESULT COMMENT, HHIVC: NORMAL
LDLC SERPL CALC-MCNC: 73.6 MG/DL (ref 0–100)
MCH RBC QN AUTO: 30 PG (ref 26–34)
MCHC RBC AUTO-ENTMCNC: 30.8 G/DL (ref 30–36.5)
MCV RBC AUTO: 97.4 FL (ref 80–99)
NRBC # BLD: 0 K/UL (ref 0–0.01)
NRBC BLD-RTO: 0 PER 100 WBC
PLATELET # BLD AUTO: 296 K/UL (ref 150–400)
PMV BLD AUTO: 10.1 FL (ref 8.9–12.9)
POTASSIUM SERPL-SCNC: 4.6 MMOL/L (ref 3.5–5.1)
PROT SERPL-MCNC: 7.7 G/DL (ref 6.4–8.2)
RBC # BLD AUTO: 5 M/UL (ref 4.1–5.7)
RPR SER QL: NONREACTIVE
SODIUM SERPL-SCNC: 139 MMOL/L (ref 136–145)
TRIGL SERPL-MCNC: 132 MG/DL (ref ?–150)
TSH SERPL DL<=0.05 MIU/L-ACNC: 0.72 UIU/ML (ref 0.36–3.74)
VLDLC SERPL CALC-MCNC: 26.4 MG/DL
WBC # BLD AUTO: 8.4 K/UL (ref 4.1–11.1)

## 2022-05-02 LAB
C TRACH RRNA SPEC QL NAA+PROBE: NEGATIVE
N GONORRHOEA RRNA SPEC QL NAA+PROBE: NEGATIVE
SPECIMEN SOURCE: NORMAL

## 2022-05-04 NOTE — PROGRESS NOTES
CMP/CBC wnl. A1c 6.0%. Lipid panel wnl, ASCVD calculated at 0.6%, however pt with h/o of CAD.  TSH wnl. GC/CT negative, RPR NR, HIV NR, HCV NR.

## 2022-05-24 DIAGNOSIS — E03.9 ACQUIRED HYPOTHYROIDISM: ICD-10-CM

## 2022-05-24 RX ORDER — LEVOTHYROXINE SODIUM 150 UG/1
TABLET ORAL
Qty: 30 TABLET | Refills: 0 | Status: SHIPPED | OUTPATIENT
Start: 2022-05-24 | End: 2022-06-21

## 2022-06-21 DIAGNOSIS — E03.9 ACQUIRED HYPOTHYROIDISM: ICD-10-CM

## 2022-06-21 RX ORDER — LEVOTHYROXINE SODIUM 150 UG/1
TABLET ORAL
Qty: 30 TABLET | Refills: 0 | Status: SHIPPED | OUTPATIENT
Start: 2022-06-21 | End: 2022-07-22

## 2022-07-21 DIAGNOSIS — E03.9 ACQUIRED HYPOTHYROIDISM: ICD-10-CM

## 2022-07-22 RX ORDER — LEVOTHYROXINE SODIUM 150 UG/1
TABLET ORAL
Qty: 30 TABLET | Refills: 0 | Status: SHIPPED | OUTPATIENT
Start: 2022-07-22 | End: 2022-08-30

## 2022-07-31 NOTE — PROCEDURES
BRIEF PROCEDURE NOTE    Date of Procedure: 4/16/2019   Preoperative Diagnosis: NSTEMI  Postoperative Diagnosis:PCI to Diag 95%    Procedure: Left heart cath, LV angiography, coronary angiography  Interventional Cardiologist: Sri Forbes MD  Anesthesia: local + IV sedation  Estimated Blood Loss: Minimal  Findings:     R Radial access  LCA - 3 DRC ( was trying to engage RCA); RCA - AL1    L Main: Nml    LAD: Med size; 30%; D1 - small 40%; D2 - small to med - Ostial 50%; Prox 95%; TIMi1 flow    LCflex: Med; MLI; OM1- small ; OM2 - med;  ( Bifurcating) - MLI    RCA: Dominant; Med to Large; MLI    LVEDP: Nml    LVEF: Not assessed; Nml by echo    No significant gradient across aortic valve. PCI: EBU 3 guide; D2 - 95%  D2 wired with BMS  LAD wired with Runthrough    D2 - Pre dil with 2 by 12  CRISTINO 2by 15  Post dil with 2.25 by 8  JOSH 1 before ; Sarah Colin after  95% before; 0% after        Specimens Removed : None    Closure Device: TR band        See full cath note.     Complications: none    Sri Forbes MD
LLQ pain since last night, no N/V/D

## 2022-09-30 ENCOUNTER — OFFICE VISIT (OUTPATIENT)
Dept: FAMILY MEDICINE CLINIC | Age: 42
End: 2022-09-30
Payer: COMMERCIAL

## 2022-09-30 VITALS
HEIGHT: 71 IN | WEIGHT: 222 LBS | SYSTOLIC BLOOD PRESSURE: 110 MMHG | OXYGEN SATURATION: 97 % | DIASTOLIC BLOOD PRESSURE: 75 MMHG | BODY MASS INDEX: 31.08 KG/M2 | HEART RATE: 59 BPM | RESPIRATION RATE: 16 BRPM

## 2022-09-30 DIAGNOSIS — R73.03 PREDIABETES: ICD-10-CM

## 2022-09-30 DIAGNOSIS — K92.0 HEMATEMESIS WITHOUT NAUSEA: Primary | ICD-10-CM

## 2022-09-30 DIAGNOSIS — I25.10 CORONARY ARTERY DISEASE INVOLVING NATIVE CORONARY ARTERY OF NATIVE HEART WITHOUT ANGINA PECTORIS: ICD-10-CM

## 2022-09-30 DIAGNOSIS — R04.2 BLOOD-TINGED SPUTUM: ICD-10-CM

## 2022-09-30 DIAGNOSIS — H61.22 IMPACTED CERUMEN OF LEFT EAR: ICD-10-CM

## 2022-09-30 DIAGNOSIS — E03.9 ACQUIRED HYPOTHYROIDISM: ICD-10-CM

## 2022-09-30 DIAGNOSIS — K21.9 GASTROESOPHAGEAL REFLUX DISEASE, UNSPECIFIED WHETHER ESOPHAGITIS PRESENT: ICD-10-CM

## 2022-09-30 PROCEDURE — 69209 REMOVE IMPACTED EAR WAX UNI: CPT | Performed by: STUDENT IN AN ORGANIZED HEALTH CARE EDUCATION/TRAINING PROGRAM

## 2022-09-30 PROCEDURE — 99214 OFFICE O/P EST MOD 30 MIN: CPT | Performed by: STUDENT IN AN ORGANIZED HEALTH CARE EDUCATION/TRAINING PROGRAM

## 2022-09-30 RX ORDER — ATORVASTATIN CALCIUM 40 MG/1
40 TABLET, FILM COATED ORAL
Qty: 90 TABLET | Refills: 0 | Status: SHIPPED | OUTPATIENT
Start: 2022-09-30

## 2022-09-30 RX ORDER — LEVOTHYROXINE SODIUM 150 UG/1
150 TABLET ORAL
Qty: 90 TABLET | Refills: 0 | Status: SHIPPED | OUTPATIENT
Start: 2022-09-30

## 2022-09-30 RX ORDER — METOPROLOL TARTRATE 25 MG/1
25 TABLET, FILM COATED ORAL EVERY 12 HOURS
Qty: 180 TABLET | Refills: 0 | Status: SHIPPED | OUTPATIENT
Start: 2022-09-30

## 2022-09-30 RX ORDER — PANTOPRAZOLE SODIUM 40 MG/1
TABLET, DELAYED RELEASE ORAL
Qty: 97 TABLET | Refills: 0 | Status: SHIPPED | OUTPATIENT
Start: 2022-09-30 | End: 2022-12-29

## 2022-09-30 RX ORDER — CLOPIDOGREL BISULFATE 75 MG/1
75 TABLET ORAL DAILY
Qty: 90 TABLET | Refills: 0 | Status: SHIPPED | OUTPATIENT
Start: 2022-09-30

## 2022-09-30 NOTE — PROGRESS NOTES
Subjective   CC:  Niya Sheridan is a 43 y.o. male who presents for f/up HTN    Blood tinged sputum/hematemesis: Recently did a lot of traveling, where he had more dietary indiscretions/had more spicy foods. With this, he noticed increased GERD. Backed off spicy foods after experiencing reflux. However, started noticing w sputum in morning, had blood tinged sputum. No cough/congestion/recent colds/fevers/chills. No one else around him w similar sxs. No hematochezia/hematemesis/melena/coffee ground emesis. PreDM: Recently had dietary indiscretions w travel, including carb heavy foods. . Back to his normal routine now x 1 week - decreased carbs/sugary foods. No daily exercise, but on his feet for 14 hours daily due to work. CAD: F/b [facility], next visit in 6 months. Cerumen impaction: Notes ears feel full. Does not use anything for ears. Complete ROS performed and pertinent positives/negatives are documented in HPI. Past Medical History  Past Medical History:   Diagnosis Date    High cholesterol     NSTEMI (non-ST elevated myocardial infarction) ([facility] 04/15/2019    Thyroid disease     Tobacco abuse 4/15/2019       Current Medications  Current Outpatient Medications   Medication Sig Dispense Refill    atorvastatin (LIPITOR) 40 mg tablet Take 1 Tablet by mouth nightly. 90 Tablet 0    metoprolol tartrate (LOPRESSOR) 25 mg tablet Take 1 Tablet by mouth every twelve (12) hours. 180 Tablet 0    levothyroxine (SYNTHROID) 150 mcg tablet Take 1 Tablet by mouth Daily (before breakfast). 90 Tablet 0    clopidogreL (PLAVIX) 75 mg tab Take 1 Tablet by mouth daily. 90 Tablet 0    pantoprazole (PROTONIX) 40 mg tablet Take 1 Tablet by mouth two (2) times a day for 7 days, THEN 1 Tablet Daily (before breakfast) for 83 days.  97 Tablet 0       Allergies  Allergies   Allergen Reactions    Aspirin Hives       Social History   Social History     Socioeconomic History    Marital status:      Spouse name: Not on file Number of children: Not on file    Years of education: Not on file    Highest education level: Not on file   Occupational History    Not on file   Tobacco Use    Smoking status: Former     Packs/day: 1.50     Years: 3.00     Pack years: 4.50     Types: Cigarettes     Quit date: 2019     Years since quitting: 3.7    Smokeless tobacco: Never   Substance and Sexual Activity    Alcohol use: Not Currently     Alcohol/week: 5.0 standard drinks     Types: 5 Shots of liquor per week    Drug use: Never    Sexual activity: Not on file   Other Topics Concern    Not on file   Social History Narrative    Not on file     Social Determinants of Health     Financial Resource Strain: Not on file   Food Insecurity: Not on file   Transportation Needs: Not on file   Physical Activity: Not on file   Stress: Not on file   Social Connections: Not on file   Intimate Partner Violence: Not on file   Housing Stability: Not on file       Family History  Family History   Problem Relation Age of Onset    High Cholesterol Mother     Hypertension Mother     Diabetes Mother     Depression Mother     Diabetes Other     Diabetes Father     No Known Problems Sister     No Known Problems Maternal Grandmother     Heart Attack Maternal Grandfather     No Known Problems Paternal Grandmother     No Known Problems Paternal Grandfather        Objective   Vital Signs  Visit Vitals  /75 (BP 1 Location: Left upper arm, BP Patient Position: Sitting)   Pulse (!) 59   Resp 16   Ht 5' 11\" (1.803 m)   Wt 222 lb (100.7 kg)   SpO2 97%   BMI 30.96 kg/m²       Physical Examination  Physical Exam  Constitutional:       General: He is not in acute distress. Appearance: Normal appearance. HENT:      Head: Normocephalic and atraumatic. Right Ear: Tympanic membrane, ear canal and external ear normal.      Left Ear: There is impacted cerumen. Mouth/Throat:      Mouth: Mucous membranes are moist.      Pharynx: Oropharynx is clear.  No oropharyngeal exudate or posterior oropharyngeal erythema. Eyes:      Extraocular Movements: Extraocular movements intact. Conjunctiva/sclera: Conjunctivae normal.   Cardiovascular:      Rate and Rhythm: Normal rate and regular rhythm. Pulmonary:      Effort: Pulmonary effort is normal.      Breath sounds: Normal breath sounds. Abdominal:      General: Abdomen is flat. Palpations: Abdomen is soft. Musculoskeletal:      Right lower leg: No edema. Left lower leg: No edema. Skin:     General: Skin is warm. Findings: No rash. Neurological:      General: No focal deficit present. Mental Status: He is alert and oriented to person, place, and time. Assessment and Plan   Ac Hudson is a 43 y.o. who is here for hematemesis/blood tinged sputum, f/up chronic conditions. 1. Hematemesis without nausea  Given association with recent worsening of GERD, will treat for reflux with Protonix-increased dosing for 1 week and then 40 mg dosing. Also will refer to GI. No red flag symptoms at this time, however will likely need an endoscopy. No n/v to suggest Jessica-Olsen tear. - REFERRAL TO GASTROENTEROLOGY  - pantoprazole (PROTONIX) 40 mg tablet; Take 1 Tablet by mouth two (2) times a day for 7 days, THEN 1 Tablet Daily (before breakfast) for 83 days. Dispense: 97 Tablet; Refill: 0    2. Blood-tinged sputum  Symptoms more consistent with reflux/PUD, however will check BNP given history of CAD and chest x-ray.    - NT-PRO BNP; Future  - METABOLIC PANEL, COMPREHENSIVE; Future  - XR CHEST PA LAT; Future    3. Prediabetes  Patient recently with more dietary indiscretions, discussed importance of diet and exercise. Will check A1c today.  - HEMOGLOBIN A1C WITH EAG; Future  - METABOLIC PANEL, COMPREHENSIVE; Future    4. Coronary artery disease involving native coronary artery of native heart without angina pectoris  Followed by [facility], next follow-up in approximately 6 months.   - metoprolol tartrate (LOPRESSOR) 25 mg tablet; Take 1 Tablet by mouth every twelve (12) hours. Dispense: 180 Tablet; Refill: 0  - clopidogreL (PLAVIX) 75 mg tab; Take 1 Tablet by mouth daily. Dispense: 90 Tablet; Refill: 0  - NT-PRO BNP; Future  - METABOLIC PANEL, COMPREHENSIVE; Future  - XR CHEST PA LAT; Future    5. Acquired hypothyroidism  Refills given today, will check TSH at this time. - levothyroxine (SYNTHROID) 150 mcg tablet; Take 1 Tablet by mouth Daily (before breakfast). Dispense: 90 Tablet; Refill: 0  - TSH 3RD GENERATION; Future  - METABOLIC PANEL, COMPREHENSIVE; Future    6. Gastroesophageal reflux disease, unspecified whether esophagitis present  Protonix as above. - METABOLIC PANEL, COMPREHENSIVE; Future  - REFERRAL TO GASTROENTEROLOGY  - pantoprazole (PROTONIX) 40 mg tablet; Take 1 Tablet by mouth two (2) times a day for 7 days, THEN 1 Tablet Daily (before breakfast) for 83 days. Dispense: 97 Tablet; Refill: 0    7. Impacted cerumen of left ear  Ear lavage performed of bilateral ears today, patient reports symptomatic improvement following lavage. RTC 4 weeks or sooner as needed. Patient is counseled to return to the office if symptoms do not improve as expected. Urgent consultation with the nearest Emergency Department is strongly recommended if condition worsens. Patient is counseled to follow up as recommended and to inform the office if any changes in treatment are recommended.       I discussed this patient with Dr. Ashlyn Hermosillo (Attending Physician)       Signed By:  Evelio Simon MD  Family Medicine Resident

## 2022-09-30 NOTE — PROGRESS NOTES
Kasie Ramirez is a 43 y.o. male    Chief Complaint   Patient presents with    Follow Up Chronic Condition       1. Have you been to the ER, urgent care clinic since your last visit? Hospitalized since your last visit? No  2. Have you seen or consulted any other health care providers outside of the [address] [place] [address] since your last visit? Include any pap smears or colon screening.  No    Visit Vitals  /75 (BP 1 Location: Left upper arm, BP Patient Position: Sitting)   Pulse (!) 59   Resp 16   Ht 5' 11\" (1.803 m)   Wt 222 lb (100.7 kg)   SpO2 97%   BMI 30.96 kg/m²     3 most recent PHQ Screens 4/22/2022   Little interest or pleasure in doing things Not at all   Feeling down, depressed, irritable, or hopeless Not at all   Total Score PHQ 2 0     Health Maintenance Due   Topic Date Due    DTaP/Tdap/Td series (1 - Tdap) Never done    COVID-19 Vaccine (3 - Booster for Pfizer series) 02/17/2022    Flu Vaccine (1) Never done

## 2022-10-03 ENCOUNTER — LAB ONLY (OUTPATIENT)
Dept: FAMILY MEDICINE CLINIC | Age: 42
End: 2022-10-03

## 2022-10-03 DIAGNOSIS — R04.2 BLOOD-TINGED SPUTUM: ICD-10-CM

## 2022-10-03 DIAGNOSIS — K21.9 GASTROESOPHAGEAL REFLUX DISEASE, UNSPECIFIED WHETHER ESOPHAGITIS PRESENT: ICD-10-CM

## 2022-10-03 DIAGNOSIS — E03.9 ACQUIRED HYPOTHYROIDISM: ICD-10-CM

## 2022-10-03 DIAGNOSIS — I25.10 CORONARY ARTERY DISEASE INVOLVING NATIVE CORONARY ARTERY OF NATIVE HEART WITHOUT ANGINA PECTORIS: ICD-10-CM

## 2022-10-03 DIAGNOSIS — R73.03 PREDIABETES: ICD-10-CM

## 2022-10-04 LAB
ALBUMIN SERPL-MCNC: 4.2 G/DL (ref 3.5–5)
ALBUMIN/GLOB SERPL: 1.4 {RATIO} (ref 1.1–2.2)
ALP SERPL-CCNC: 73 U/L (ref 45–117)
ALT SERPL-CCNC: 49 U/L (ref 12–78)
ANION GAP SERPL CALC-SCNC: 5 MMOL/L (ref 5–15)
AST SERPL-CCNC: 27 U/L (ref 15–37)
BILIRUB SERPL-MCNC: 0.4 MG/DL (ref 0.2–1)
BNP SERPL-MCNC: 42 PG/ML
BUN SERPL-MCNC: 14 MG/DL (ref 6–20)
BUN/CREAT SERPL: 16 (ref 12–20)
CALCIUM SERPL-MCNC: 9.1 MG/DL (ref 8.5–10.1)
CHLORIDE SERPL-SCNC: 102 MMOL/L (ref 97–108)
CO2 SERPL-SCNC: 29 MMOL/L (ref 21–32)
CREAT SERPL-MCNC: 0.87 MG/DL (ref 0.7–1.3)
EST. AVERAGE GLUCOSE BLD GHB EST-MCNC: 131 MG/DL
GLOBULIN SER CALC-MCNC: 3.1 G/DL (ref 2–4)
GLUCOSE SERPL-MCNC: 117 MG/DL (ref 65–100)
HBA1C MFR BLD: 6.2 % (ref 4–5.6)
POTASSIUM SERPL-SCNC: 4.2 MMOL/L (ref 3.5–5.1)
PROT SERPL-MCNC: 7.3 G/DL (ref 6.4–8.2)
SODIUM SERPL-SCNC: 136 MMOL/L (ref 136–145)
TSH SERPL DL<=0.05 MIU/L-ACNC: 1.35 UIU/ML (ref 0.36–3.74)

## 2022-10-25 ENCOUNTER — NURSE TRIAGE (OUTPATIENT)
Dept: OTHER | Facility: CLINIC | Age: 42
End: 2022-10-25

## 2022-10-25 ENCOUNTER — TELEPHONE (OUTPATIENT)
Dept: FAMILY MEDICINE CLINIC | Age: 42
End: 2022-10-25

## 2022-10-25 NOTE — TELEPHONE ENCOUNTER
Received call from [facility] at [facility] with Red Flag Complaint. Current Symptoms: Pt states that he has been coughing up blood every morning for the last couple of months. He has a h/o acid reflux. He denies issues with breathing. He is on Plavix. Onset: A couple of months ago    Associated Symptoms: NA    Pain Severity: Denies pain    Temperature: Denies fever    What has been tried: Protonix     LMP: NA Pregnant: NA    Recommended disposition: See in Office Today    Care advice provided, patient verbalizes understanding; denies any other questions or concerns; instructed to call back for any new or worsening symptoms. Patient/Caller agrees with recommended disposition; writer provided warm transfer to Mari Oliva at [facility] for appointment scheduling    Attention Provider: Thank you for allowing me to participate in the care of your patient. The patient was connected to triage in response to information provided to the [facility]. Please do not respond through this encounter as the response is not directed to a shared pool.     Reason for Disposition   Taking Coumadin (warfarin) or other strong blood thinner, or known bleeding disorder (e.g., thrombocytopenia)    Protocols used: Coughing Up Blood-ADULT-[place]

## 2022-10-25 NOTE — TELEPHONE ENCOUNTER
Pt spoke with the [facility] ([facility]) nurse triage. Called the patient back. He expressed he wanted to see if we could schedule an appointment with GI, based on a referral Dr. Mian Sheridan sent last month. I notified the patient that we are not able to schedule for GI, but confirmed that Dr. Sadie Tucker office has received the referral. Provided Mr. Cinthia Han with the number to contact GI ([phone]) He verbalized understanding.

## 2022-11-01 NOTE — TELEPHONE ENCOUNTER
----- Message from Jordin Tenorio sent at 10/25/2022  2:30 PM EDT -----  Subject: Message to Provider    QUESTIONS  Information for Provider? This is a nt teturn to us. Dis to be seen in   immediately, resistant bleeding while coughing.   ---------------------------------------------------------------------------  --------------  Maria Fernanda Factor INFO  [phone]; OK to leave message on voicemail  ---------------------------------------------------------------------------  --------------  SCRIPT ANSWERS  Relationship to Patient?  Self

## 2022-11-17 ENCOUNTER — OFFICE VISIT (OUTPATIENT)
Dept: FAMILY MEDICINE CLINIC | Age: 42
End: 2022-11-17
Payer: COMMERCIAL

## 2022-11-17 VITALS
HEART RATE: 79 BPM | OXYGEN SATURATION: 99 % | RESPIRATION RATE: 18 BRPM | WEIGHT: 215.5 LBS | HEIGHT: 71 IN | DIASTOLIC BLOOD PRESSURE: 79 MMHG | TEMPERATURE: 97.5 F | BODY MASS INDEX: 30.17 KG/M2 | SYSTOLIC BLOOD PRESSURE: 124 MMHG

## 2022-11-17 DIAGNOSIS — K64.4 SKIN TAG OF ANUS: Primary | ICD-10-CM

## 2022-11-17 PROCEDURE — 99213 OFFICE O/P EST LOW 20 MIN: CPT | Performed by: STUDENT IN AN ORGANIZED HEALTH CARE EDUCATION/TRAINING PROGRAM

## 2022-11-17 NOTE — PROGRESS NOTES
Chief Complaint   Patient presents with    Follow-up     Possible hemorrhoids - 3 weeks ago. Visit Vitals  /79 (BP 1 Location: Right upper arm, BP Patient Position: Sitting, BP Cuff Size: Large adult)   Pulse 79   Temp 97.5 °F (36.4 °C) (Temporal)   Resp 18   Ht 5' 11\" (1.803 m)   Wt 215 lb 8 oz (97.8 kg)   SpO2 99%   BMI 30.06 kg/m²     1. Have you been to the ER, urgent care clinic since your last visit? Hospitalized since your last visit? No    2. Have you seen or consulted any other health care providers outside of the [address] [place] [address] since your last visit? Include any pap smears or colon screening.  No

## 2022-11-17 NOTE — PROGRESS NOTES
Subjective   CC:  Neeraj Pappas is a 43 y.o. male who presents for concern for hemorrhoids    Possible hemorrhoid:   - Pt with lesion near anus, presents today with picture of phone of lesion  - Describes as 3cm or so  - Not painful, no bleeding, no BRBPR, irritation, itching associated with lesion  - Fhx of hemorrhoids  - Declining exam today  - GI appt upcoming in a couple of weeks    PreDM:   - Diet: making improvements to diet - eating more lentils, chickpeas, tofu, guacamole  - Drinking water, drinks alcohol only on weekends  - Walking [id number] steps a day, running regularly    No f/c, cough/colds, Has, chest pain, palp, dyspnea, abd pain, n/v, c/d. Complete ROS performed and pertinent positives/negatives are documented in HPI. Past Medical History  Past Medical History:   Diagnosis Date    High cholesterol     NSTEMI (non-ST elevated myocardial infarction) ([facility] 04/15/2019    Thyroid disease     Tobacco abuse 4/15/2019       Current Medications  Current Outpatient Medications   Medication Sig Dispense Refill    atorvastatin (LIPITOR) 40 mg tablet Take 1 Tablet by mouth nightly. 90 Tablet 0    metoprolol tartrate (LOPRESSOR) 25 mg tablet Take 1 Tablet by mouth every twelve (12) hours. 180 Tablet 0    levothyroxine (SYNTHROID) 150 mcg tablet Take 1 Tablet by mouth Daily (before breakfast). 90 Tablet 0    clopidogreL (PLAVIX) 75 mg tab Take 1 Tablet by mouth daily. 90 Tablet 0    pantoprazole (PROTONIX) 40 mg tablet Take 1 Tablet by mouth two (2) times a day for 7 days, THEN 1 Tablet Daily (before breakfast) for 83 days.  (Patient not taking: Reported on 11/17/2022) 97 Tablet 0       Allergies  Allergies   Allergen Reactions    Aspirin Hives       Social History   Social History     Socioeconomic History    Marital status:      Spouse name: Not on file    Number of children: Not on file    Years of education: Not on file    Highest education level: Not on file   Occupational History    Not on file Tobacco Use    Smoking status: Former     Packs/day: 1.50     Years: 3.00     Pack years: 4.50     Types: Cigarettes     Quit date: 2019     Years since quitting: 3.8    Smokeless tobacco: Never   Substance and Sexual Activity    Alcohol use: Not Currently     Alcohol/week: 5.0 standard drinks     Types: 5 Shots of liquor per week    Drug use: Never    Sexual activity: Not on file   Other Topics Concern    Not on file   Social History Narrative    Not on file     Social Determinants of Health     Financial Resource Strain: Not on file   Food Insecurity: Not on file   Transportation Needs: Not on file   Physical Activity: Not on file   Stress: Not on file   Social Connections: Not on file   Intimate Partner Violence: Not on file   Housing Stability: Not on file       Family History  Family History   Problem Relation Age of Onset    High Cholesterol Mother     Hypertension Mother     Diabetes Mother     Depression Mother     Diabetes Other     Diabetes Father     No Known Problems Sister     No Known Problems Maternal Grandmother     Heart Attack Maternal Grandfather     No Known Problems Paternal Grandmother     No Known Problems Paternal Grandfather        Objective   Vital Signs  Visit Vitals  /79 (BP 1 Location: Right upper arm, BP Patient Position: Sitting, BP Cuff Size: Large adult)   Pulse 79   Temp 97.5 °F (36.4 °C) (Temporal)   Resp 18   Ht 5' 11\" (1.803 m)   Wt 215 lb 8 oz (97.8 kg)   SpO2 99%   BMI 30.06 kg/m²       Physical Examination  Physical Exam  Constitutional:       General: He is not in acute distress. Appearance: Normal appearance. HENT:      Head: Normocephalic and atraumatic. Eyes:      Extraocular Movements: Extraocular movements intact. Conjunctiva/sclera: Conjunctivae normal.   Cardiovascular:      Rate and Rhythm: Normal rate and regular rhythm. Pulmonary:      Effort: Pulmonary effort is normal.      Breath sounds: Normal breath sounds.    Abdominal:      General: Abdomen is flat. Palpations: Abdomen is soft. Genitourinary:     Comments: Exam deferred  Musculoskeletal:      Right lower leg: No edema. Left lower leg: No edema. Skin:     General: Skin is warm. Findings: No rash. Neurological:      General: No focal deficit present. Mental Status: He is alert and oriented to person, place, and time. Image on patient's phone reviewed:  pt with approx 3cm fleshy protrusion from anus, presence of blood vessels or discoloration not appreciated. Assessment and Plan   Dina Johnson is a 43 y.o. who is here for skin tag of anus. 1. Skin tag of anus  Based on hx and image presented by patient, skin tag most likely diagnosis  has upcoming GI appt, recommended he touch base with them about presence of this protrusion  given ED precautions and return to clinic precaution     RTC for f/up of preDM and chronic conditions    Patient is counseled to return to the office if symptoms do not improve as expected. Urgent consultation with the nearest Emergency Department is strongly recommended if condition worsens. Patient is counseled to follow up as recommended and to inform the office if any changes in treatment are recommended.       I discussed this patient with Dr. Augusto Pittman (Attending Physician)       Signed By:  Dariana Tran MD  Family Medicine Resident

## 2022-12-22 NOTE — Clinical Note
ID band present and verified. Family is in the waiting area. Patient arrived via:    __x___ambulatory    _____wheelchair    _____stretcher      Domestic violence screen    ____x__negative______positive    Breast Implants:    _______yes _____x___no

## 2023-01-14 DIAGNOSIS — E03.9 ACQUIRED HYPOTHYROIDISM: ICD-10-CM

## 2023-01-16 RX ORDER — LEVOTHYROXINE SODIUM 150 UG/1
TABLET ORAL
Qty: 90 TABLET | Refills: 0 | Status: SHIPPED | OUTPATIENT
Start: 2023-01-16

## 2023-01-19 ENCOUNTER — VIRTUAL VISIT (OUTPATIENT)
Dept: FAMILY MEDICINE CLINIC | Age: 43
End: 2023-01-19
Payer: COMMERCIAL

## 2023-01-19 DIAGNOSIS — B96.89 ACUTE BACTERIAL RHINOSINUSITIS: Primary | ICD-10-CM

## 2023-01-19 DIAGNOSIS — J01.90 ACUTE BACTERIAL RHINOSINUSITIS: Primary | ICD-10-CM

## 2023-01-19 DIAGNOSIS — M26.609 TMJ DYSFUNCTION: ICD-10-CM

## 2023-01-19 DIAGNOSIS — H69.83 EUSTACHIAN TUBE DYSFUNCTION, BILATERAL: ICD-10-CM

## 2023-01-19 PROCEDURE — 99421 OL DIG E/M SVC 5-10 MIN: CPT | Performed by: STUDENT IN AN ORGANIZED HEALTH CARE EDUCATION/TRAINING PROGRAM

## 2023-01-19 RX ORDER — AMOXICILLIN AND CLAVULANATE POTASSIUM 875; 125 MG/1; MG/1
1 TABLET, FILM COATED ORAL EVERY 12 HOURS
Qty: 10 TABLET | Refills: 0 | Status: SHIPPED | OUTPATIENT
Start: 2023-01-19 | End: 2023-01-24

## 2023-01-19 NOTE — PROGRESS NOTES
Cj Heart  43 y.o. male  1980  [address]  [id number]   [address]:    Telemedicine Progress Note  Merlyn Dunlap MD       Encounter Date and Time: January 19, 2023 at 2:51 PM    Cj Heart, was evaluated through a synchronous (real-time) audio-video encounter. The patient (or guardian if applicable) is aware that this is a billable service, which includes applicable co-pays. This Virtual Visit was conducted with patient's (and/or legal guardian's) consent. The visit was conducted pursuant to the emergency declaration under the [address] [place] [address], [address] [place] [address] authority and the [employer] and [employer] General Act. Patient identification was verified, and a caregiver was present when appropriate. The patient was located at: Home: [address]  The provider was located at: Home: Thomas Babinski, MD on 1/19/2023 at 2:51 PM    No chief complaint on file. History of Present Illness   Cj Heart is a 43 y.o. male was evaluated by synchronous (real-time) audio-video technology from home, through a secure patient portal.    URI:   - Recent travel to [facility], his girlfriend got sick on trip  - Has had 8-10 days of congestion, TMJ type pain, some sinus pain  - Taking Nyquil w/o relief of symptoms  - Planning on seing dentist soon  - Concerned b/c symptoms started on right, now bilateral  - Eating and drinking ok  - No dysphagia, sxs do not resemble past sxs of NSTEMI    No f/c, cough/colds, Has, chest pain, palp, dyspnea, abd pain, n/v, c/d. A complete ROS was performed and pertinent negatives and positives are as documented in HPI.     Vitals/Objective:     General: alert, cooperative, no distress   Mental  status: mental status: alert, oriented to person, place, and time, normal mood, behavior, speech, dress, motor activity, and thought processes   Resp: resp: normal effort and no respiratory distress   Neuro: neuro: no gross deficits   Due to this being a TeleHealth evaluation, many elements of the physical examination are unable to be assessed. Assessment and Plan:   1. Acute bacterial rhinosinusitis  Pt w/ 8-10 days of sinus type pain following recent URI with positive sick contact of girlfriend  will tx empirically for ABRS and evaluate for improvement, if not improving, consider steroid dose pack for the below  ED precautions given  - amoxicillin-clavulanate (AUGMENTIN) 875-125 mg per tablet; Take 1 Tablet by mouth every twelve (12) hours for 5 days. Dispense: 10 Tablet; Refill: 0    2. TMJ dysfunction  Likely contributory to pain as pt reports clenching jaw, will see dentist soon for this issue    3. Eustachian tube dysfunction, bilateral  ISO recent travel, consider ENT referral/further management if sxs do not improve    Time spent in direct conversation with the patient to include medical condition(s) discussed, assessment and treatment plan:       We discussed the expected course, resolution and complications of the diagnosis(es) in detail. Medication risks, benefits, costs, interactions, and alternatives were discussed as indicated. I advised him to contact the office if his condition worsens, changes or fails to improve as anticipated. He expressed understanding with the diagnosis(es) and plan. Patient understands that this encounter was a temporary measure, and the importance of further follow up and examination was emphasized. Patient verbalized understanding. Patient informed to follow up: as scheduled. Electronically Signed: Aurelia Munson MD     History   Patients past medical, surgical and family histories were reviewed and updated.       Past Medical History:   Diagnosis Date    High cholesterol     NSTEMI (non-ST elevated myocardial infarction) ([facility]) 04/15/2019    Thyroid disease     Tobacco abuse 4/15/2019     Past Surgical History:   Procedure Laterality Date    HX CORONARY STENT PLACEMENT      HX MENISCUS REPAIR Left 2017     Family History   Problem Relation Age of Onset    High Cholesterol Mother     Hypertension Mother     Diabetes Mother     Depression Mother     Diabetes Other     Diabetes Father     No Known Problems Sister     No Known Problems Maternal Grandmother     Heart Attack Maternal Grandfather     No Known Problems Paternal Grandmother     No Known Problems Paternal Grandfather      Social History     Tobacco Use    Smoking status: Former     Packs/day: 1.50     Years: 3.00     Pack years: 4.50     Types: Cigarettes     Quit date:      Years since quittin.0    Smokeless tobacco: Never   Substance Use Topics    Alcohol use: Not Currently     Alcohol/week: 5.0 standard drinks     Types: 5 Shots of liquor per week    Drug use: Never     Patient Active Problem List   Diagnosis Code    NSTEMI (non-ST elevated myocardial infarction) ([facility] I21.4    High cholesterol E78.00    Tobacco abuse Z72.0    Leukocytosis D72.829    Coronary artery disease involving native coronary artery of native heart without angina pectoris I25.10    Acquired hypothyroidism E03.9    Prediabetes R73.03          Current Medications/Allergies   Medications and Allergies reviewed:    Current Outpatient Medications   Medication Sig Dispense Refill    amoxicillin-clavulanate (AUGMENTIN) 875-125 mg per tablet Take 1 Tablet by mouth every twelve (12) hours for 5 days. 10 Tablet 0    levothyroxine (SYNTHROID) 150 mcg tablet TAKE 1 TABLET BY MOUTH EVERY DAY BEFORE BREAKFAST 90 Tablet 0    atorvastatin (LIPITOR) 40 mg tablet Take 1 Tablet by mouth nightly. 90 Tablet 0    metoprolol tartrate (LOPRESSOR) 25 mg tablet Take 1 Tablet by mouth every twelve (12) hours. 180 Tablet 0    clopidogreL (PLAVIX) 75 mg tab Take 1 Tablet by mouth daily.  90 Tablet 0     Allergies   Allergen Reactions    Aspirin Hives

## 2023-01-24 DIAGNOSIS — I25.10 CORONARY ARTERY DISEASE INVOLVING NATIVE CORONARY ARTERY OF NATIVE HEART WITHOUT ANGINA PECTORIS: ICD-10-CM

## 2023-01-25 RX ORDER — METOPROLOL TARTRATE 25 MG/1
TABLET, FILM COATED ORAL
Qty: 180 TABLET | Refills: 0 | Status: SHIPPED | OUTPATIENT
Start: 2023-01-25

## 2023-05-04 DIAGNOSIS — K92.0 HEMATEMESIS WITHOUT NAUSEA: ICD-10-CM

## 2023-05-04 DIAGNOSIS — E03.9 ACQUIRED HYPOTHYROIDISM: ICD-10-CM

## 2023-05-04 DIAGNOSIS — I25.10 CORONARY ARTERY DISEASE INVOLVING NATIVE CORONARY ARTERY OF NATIVE HEART WITHOUT ANGINA PECTORIS: ICD-10-CM

## 2023-05-04 DIAGNOSIS — K21.9 GASTROESOPHAGEAL REFLUX DISEASE, UNSPECIFIED WHETHER ESOPHAGITIS PRESENT: ICD-10-CM

## 2023-05-04 RX ORDER — CLOPIDOGREL BISULFATE 75 MG/1
TABLET ORAL
Qty: 90 TABLET | Refills: 0 | Status: SHIPPED | OUTPATIENT
Start: 2023-05-04

## 2023-05-04 RX ORDER — PANTOPRAZOLE SODIUM 40 MG/1
TABLET, DELAYED RELEASE ORAL
Qty: 60 TABLET | Refills: 1 | Status: SHIPPED | OUTPATIENT
Start: 2023-05-04

## 2023-05-04 RX ORDER — METOPROLOL TARTRATE 25 MG/1
TABLET, FILM COATED ORAL
Qty: 180 TABLET | Refills: 0 | Status: SHIPPED | OUTPATIENT
Start: 2023-05-04

## 2023-05-04 RX ORDER — LEVOTHYROXINE SODIUM 150 UG/1
TABLET ORAL
Qty: 90 TABLET | Refills: 0 | Status: SHIPPED | OUTPATIENT
Start: 2023-05-04

## 2023-05-10 RX ORDER — ATORVASTATIN CALCIUM 40 MG/1
TABLET, FILM COATED ORAL NIGHTLY
Qty: 90 TABLET | Refills: 2 | Status: SHIPPED | OUTPATIENT
Start: 2023-05-10

## 2023-05-30 DIAGNOSIS — K92.0 HEMATEMESIS: ICD-10-CM

## 2023-05-30 DIAGNOSIS — K21.9 GASTRO-ESOPHAGEAL REFLUX DISEASE WITHOUT ESOPHAGITIS: ICD-10-CM

## 2023-05-31 RX ORDER — PANTOPRAZOLE SODIUM 40 MG/1
TABLET, DELAYED RELEASE ORAL
Qty: 60 TABLET | Refills: 1 | OUTPATIENT
Start: 2023-05-31

## 2023-06-19 ENCOUNTER — APPOINTMENT (OUTPATIENT)
Facility: HOSPITAL | Age: 43
End: 2023-06-19
Payer: COMMERCIAL

## 2023-06-19 ENCOUNTER — HOSPITAL ENCOUNTER (EMERGENCY)
Facility: HOSPITAL | Age: 43
Discharge: HOME | End: 2023-06-19
Attending: EMERGENCY MEDICINE
Payer: COMMERCIAL

## 2023-06-19 VITALS
HEIGHT: 71 IN | RESPIRATION RATE: 16 BRPM | OXYGEN SATURATION: 96 % | HEART RATE: 66 BPM | WEIGHT: 213 LBS | TEMPERATURE: 97.5 F | BODY MASS INDEX: 29.82 KG/M2 | SYSTOLIC BLOOD PRESSURE: 112 MMHG | DIASTOLIC BLOOD PRESSURE: 79 MMHG

## 2023-06-19 DIAGNOSIS — M54.2 NECK PAIN: Primary | ICD-10-CM

## 2023-06-19 LAB
ALBUMIN SERPL-MCNC: 3.8 G/DL (ref 3.5–5)
ALBUMIN/GLOB SERPL: 1.1 (ref 1.1–2.2)
ALP SERPL-CCNC: 77 U/L (ref 45–117)
ALT SERPL-CCNC: 34 U/L (ref 12–78)
ANION GAP SERPL CALC-SCNC: 4 MMOL/L (ref 5–15)
AST SERPL-CCNC: 23 U/L (ref 15–37)
BASOPHILS # BLD: 0 K/UL (ref 0–0.1)
BASOPHILS NFR BLD: 1 % (ref 0–1)
BILIRUB SERPL-MCNC: 0.5 MG/DL (ref 0.2–1)
BUN SERPL-MCNC: 12 MG/DL (ref 6–20)
BUN/CREAT SERPL: 15 (ref 12–20)
CALCIUM SERPL-MCNC: 9.1 MG/DL (ref 8.5–10.1)
CHLORIDE SERPL-SCNC: 107 MMOL/L (ref 97–108)
CO2 SERPL-SCNC: 27 MMOL/L (ref 21–32)
CREAT SERPL-MCNC: 0.82 MG/DL (ref 0.7–1.3)
DIFFERENTIAL METHOD BLD: NORMAL
EKG ATRIAL RATE: 63 BPM
EKG DIAGNOSIS: NORMAL
EKG P AXIS: 48 DEGREES
EKG P-R INTERVAL: 148 MS
EKG Q-T INTERVAL: 392 MS
EKG QRS DURATION: 94 MS
EKG QTC CALCULATION (BAZETT): 401 MS
EKG R AXIS: 71 DEGREES
EKG T AXIS: 45 DEGREES
EKG VENTRICULAR RATE: 63 BPM
EOSINOPHIL # BLD: 0.3 K/UL (ref 0–0.4)
EOSINOPHIL NFR BLD: 3 % (ref 0–7)
ERYTHROCYTE [DISTWIDTH] IN BLOOD BY AUTOMATED COUNT: 12.2 % (ref 11.5–14.5)
GLOBULIN SER CALC-MCNC: 3.6 G/DL (ref 2–4)
GLUCOSE SERPL-MCNC: 125 MG/DL (ref 65–100)
HCT VFR BLD AUTO: 43 % (ref 36.6–50.3)
HGB BLD-MCNC: 14 G/DL (ref 12.1–17)
IMM GRANULOCYTES # BLD AUTO: 0 K/UL (ref 0–0.04)
IMM GRANULOCYTES NFR BLD AUTO: 0 % (ref 0–0.5)
LYMPHOCYTES # BLD: 2.2 K/UL (ref 0.8–3.5)
LYMPHOCYTES NFR BLD: 26 % (ref 12–49)
MCH RBC QN AUTO: 30.2 PG (ref 26–34)
MCHC RBC AUTO-ENTMCNC: 32.6 G/DL (ref 30–36.5)
MCV RBC AUTO: 92.7 FL (ref 80–99)
MONOCYTES # BLD: 0.6 K/UL (ref 0–1)
MONOCYTES NFR BLD: 7 % (ref 5–13)
NEUTS SEG # BLD: 5.3 K/UL (ref 1.8–8)
NEUTS SEG NFR BLD: 63 % (ref 32–75)
NRBC # BLD: 0 K/UL (ref 0–0.01)
NRBC BLD-RTO: 0 PER 100 WBC
PLATELET # BLD AUTO: 297 K/UL (ref 150–400)
PMV BLD AUTO: 10.5 FL (ref 8.9–12.9)
POTASSIUM SERPL-SCNC: 4 MMOL/L (ref 3.5–5.1)
PROT SERPL-MCNC: 7.4 G/DL (ref 6.4–8.2)
RBC # BLD AUTO: 4.64 M/UL (ref 4.1–5.7)
SODIUM SERPL-SCNC: 138 MMOL/L (ref 136–145)
TROPONIN I SERPL HS-MCNC: <4 NG/L (ref 0–76)
TROPONIN I SERPL HS-MCNC: <4 NG/L (ref 0–76)
WBC # BLD AUTO: 8.5 K/UL (ref 4.1–11.1)

## 2023-06-19 PROCEDURE — 99285 EMERGENCY DEPT VISIT HI MDM: CPT

## 2023-06-19 PROCEDURE — 6370000000 HC RX 637 (ALT 250 FOR IP): Performed by: EMERGENCY MEDICINE

## 2023-06-19 PROCEDURE — 80053 COMPREHEN METABOLIC PANEL: CPT

## 2023-06-19 PROCEDURE — 93010 ELECTROCARDIOGRAM REPORT: CPT | Performed by: SPECIALIST

## 2023-06-19 PROCEDURE — 36415 COLL VENOUS BLD VENIPUNCTURE: CPT

## 2023-06-19 PROCEDURE — 70450 CT HEAD/BRAIN W/O DYE: CPT

## 2023-06-19 PROCEDURE — 84484 ASSAY OF TROPONIN QUANT: CPT

## 2023-06-19 PROCEDURE — 71045 X-RAY EXAM CHEST 1 VIEW: CPT

## 2023-06-19 PROCEDURE — 85025 COMPLETE CBC W/AUTO DIFF WBC: CPT

## 2023-06-19 PROCEDURE — 93005 ELECTROCARDIOGRAM TRACING: CPT | Performed by: EMERGENCY MEDICINE

## 2023-06-19 PROCEDURE — 72125 CT NECK SPINE W/O DYE: CPT

## 2023-06-19 RX ORDER — METHOCARBAMOL 500 MG/1
500 TABLET, FILM COATED ORAL 3 TIMES DAILY
Qty: 9 TABLET | Refills: 0 | Status: SHIPPED | OUTPATIENT
Start: 2023-06-19 | End: 2023-06-22

## 2023-06-19 RX ORDER — ACETAMINOPHEN 325 MG/1
650 TABLET ORAL
Status: COMPLETED | OUTPATIENT
Start: 2023-06-19 | End: 2023-06-19

## 2023-06-19 RX ADMIN — ACETAMINOPHEN 650 MG: 325 TABLET ORAL at 13:30

## 2023-06-19 ASSESSMENT — PAIN DESCRIPTION - LOCATION: LOCATION: NECK

## 2023-06-19 ASSESSMENT — PAIN - FUNCTIONAL ASSESSMENT: PAIN_FUNCTIONAL_ASSESSMENT: 0-10

## 2023-06-19 ASSESSMENT — ENCOUNTER SYMPTOMS
BACK PAIN: 1
SHORTNESS OF BREATH: 0
CHEST TIGHTNESS: 0

## 2023-06-19 NOTE — ED PROVIDER NOTES
pain          DISPOSITION/PLAN   DISPOSITION Decision To Discharge 06/19/2023 02:41:50 PM      PATIENT REFERRED TO:  Dominic Hernandez MD  [address]  [phone]            DISCHARGE MEDICATIONS:  New Prescriptions    METHOCARBAMOL (ROBAXIN) 500 MG TABLET    Take 1 tablet by mouth 3 times daily for 3 days         (Please note that portions of this note were completed with a voice recognition program.  Efforts were made to edit the dictations but occasionally words are mis-transcribed.)    Javad Quinn DO (electronically signed)  Emergency Attending Physician / Physician Assistant / Nurse Practitioner            Forrest Hoff DO  06/19/23 Jossy [id number],   06/19/23 [id number]

## 2023-06-19 NOTE — ED TRIAGE NOTES
Pt complains of back neck pain. He woke up with it at 3am. Pt went to work til 9am and then came here. Pt has hx of MI. Pt takes Plavix daily.

## 2023-07-31 DIAGNOSIS — I25.10 ATHEROSCLEROTIC HEART DISEASE OF NATIVE CORONARY ARTERY WITHOUT ANGINA PECTORIS: ICD-10-CM

## 2023-08-08 DIAGNOSIS — E03.9 HYPOTHYROIDISM, UNSPECIFIED: ICD-10-CM

## 2023-08-10 RX ORDER — LEVOTHYROXINE SODIUM 0.15 MG/1
TABLET ORAL
Qty: 90 TABLET | Refills: 0 | Status: SHIPPED | OUTPATIENT
Start: 2023-08-10

## 2023-09-06 DIAGNOSIS — I25.10 ATHEROSCLEROTIC HEART DISEASE OF NATIVE CORONARY ARTERY WITHOUT ANGINA PECTORIS: ICD-10-CM

## 2023-09-07 ENCOUNTER — PATIENT MESSAGE (OUTPATIENT)
Age: 43
End: 2023-09-07

## 2023-09-07 NOTE — TELEPHONE ENCOUNTER
This writer attempted to Estée Shu patient via phone to assist with scheduling a follow up appointment. This writer was unable to reach patient or leave a voicemail at this time. A [employer] message was sent to patient advising to him to contact the office to scheduled an appointment. Refill request forwarded to physician to provided a short term supply of medication.      Medication Refill Request    Anthony Hazel is requesting a refill of the following medication(s):   Requested Prescriptions     Pending Prescriptions Disp Refills    clopidogrel (PLAVIX) 75 MG tablet [Pharmacy Med Name: CLOPIDOGREL 75 MG TABLET] 30 tablet 2     Sig: TAKE 1 TABLET BY MOUTH EVERY DAY      Last provider to prescribe medication: Dr. Tatum Jerez  Last Date of Medication Prescribed: 05/04/2023   Last Office Visit Date: 09/30/2022 - chronic condition follow up    Please send refill to:    [facility]/pharmacy #[id number]- [place], [place] - [address] [phone]  [address] [facility]  [address]  Phone: [phone] Fax: [phone]

## 2023-09-07 NOTE — TELEPHONE ENCOUNTER
Pt has called back and scheduled his appt for 9/21. He is requesting a short refill of Clopidogrel 75 mg to last until his appt.     Thank you

## 2023-09-08 RX ORDER — CLOPIDOGREL BISULFATE 75 MG/1
TABLET ORAL
Qty: 30 TABLET | Refills: 2 | Status: SHIPPED | OUTPATIENT
Start: 2023-09-08

## 2023-09-08 RX ORDER — CLOPIDOGREL BISULFATE 75 MG/1
75 TABLET ORAL DAILY
Qty: 30 TABLET | Refills: 0 | OUTPATIENT
Start: 2023-09-08

## 2023-09-08 NOTE — TELEPHONE ENCOUNTER
Please be advised, pt called again. He stated that this medication is a necessity and need to be sent to pharmacy today, due to being completely out of it. .    Thank you

## 2023-09-21 ENCOUNTER — OFFICE VISIT (OUTPATIENT)
Age: 43
End: 2023-09-21
Payer: COMMERCIAL

## 2023-09-21 VITALS
RESPIRATION RATE: 18 BRPM | WEIGHT: 218.8 LBS | HEIGHT: 71 IN | OXYGEN SATURATION: 96 % | SYSTOLIC BLOOD PRESSURE: 111 MMHG | HEART RATE: 74 BPM | DIASTOLIC BLOOD PRESSURE: 79 MMHG | BODY MASS INDEX: 30.63 KG/M2

## 2023-09-21 DIAGNOSIS — R73.03 PREDIABETES: ICD-10-CM

## 2023-09-21 DIAGNOSIS — I25.10 CORONARY ARTERY DISEASE INVOLVING NATIVE CORONARY ARTERY OF NATIVE HEART WITHOUT ANGINA PECTORIS: Primary | ICD-10-CM

## 2023-09-21 DIAGNOSIS — E03.9 ACQUIRED HYPOTHYROIDISM: ICD-10-CM

## 2023-09-21 PROCEDURE — 99214 OFFICE O/P EST MOD 30 MIN: CPT | Performed by: STUDENT IN AN ORGANIZED HEALTH CARE EDUCATION/TRAINING PROGRAM

## 2023-09-21 SDOH — ECONOMIC STABILITY: FOOD INSECURITY: WITHIN THE PAST 12 MONTHS, YOU WORRIED THAT YOUR FOOD WOULD RUN OUT BEFORE YOU GOT MONEY TO BUY MORE.: NEVER TRUE

## 2023-09-21 SDOH — ECONOMIC STABILITY: HOUSING INSECURITY
IN THE LAST 12 MONTHS, WAS THERE A TIME WHEN YOU DID NOT HAVE A STEADY PLACE TO SLEEP OR SLEPT IN A SHELTER (INCLUDING NOW)?: NO

## 2023-09-21 SDOH — ECONOMIC STABILITY: INCOME INSECURITY: HOW HARD IS IT FOR YOU TO PAY FOR THE VERY BASICS LIKE FOOD, HOUSING, MEDICAL CARE, AND HEATING?: NOT HARD AT ALL

## 2023-09-21 SDOH — ECONOMIC STABILITY: FOOD INSECURITY: WITHIN THE PAST 12 MONTHS, THE FOOD YOU BOUGHT JUST DIDN'T LAST AND YOU DIDN'T HAVE MONEY TO GET MORE.: NEVER TRUE

## 2023-09-21 ASSESSMENT — PATIENT HEALTH QUESTIONNAIRE - PHQ9
SUM OF ALL RESPONSES TO PHQ QUESTIONS 1-9: 0
SUM OF ALL RESPONSES TO PHQ QUESTIONS 1-9: 0
SUM OF ALL RESPONSES TO PHQ9 QUESTIONS 1 & 2: 0
2. FEELING DOWN, DEPRESSED OR HOPELESS: 0
SUM OF ALL RESPONSES TO PHQ QUESTIONS 1-9: 0
1. LITTLE INTEREST OR PLEASURE IN DOING THINGS: 0
SUM OF ALL RESPONSES TO PHQ QUESTIONS 1-9: 0

## 2023-09-22 LAB
ALBUMIN SERPL-MCNC: 4.4 G/DL (ref 3.5–5)
ALBUMIN/GLOB SERPL: 1.4 (ref 1.1–2.2)
ALP SERPL-CCNC: 61 U/L (ref 45–117)
ALT SERPL-CCNC: 46 U/L (ref 12–78)
ANION GAP SERPL CALC-SCNC: 5 MMOL/L (ref 5–15)
AST SERPL-CCNC: 29 U/L (ref 15–37)
BILIRUB SERPL-MCNC: 0.5 MG/DL (ref 0.2–1)
BUN SERPL-MCNC: 14 MG/DL (ref 6–20)
BUN/CREAT SERPL: 16 (ref 12–20)
CALCIUM SERPL-MCNC: 9.4 MG/DL (ref 8.5–10.1)
CHLORIDE SERPL-SCNC: 104 MMOL/L (ref 97–108)
CHOLEST SERPL-MCNC: 219 MG/DL
CO2 SERPL-SCNC: 28 MMOL/L (ref 21–32)
CREAT SERPL-MCNC: 0.85 MG/DL (ref 0.7–1.3)
ERYTHROCYTE [DISTWIDTH] IN BLOOD BY AUTOMATED COUNT: 12.8 % (ref 11.5–14.5)
EST. AVERAGE GLUCOSE BLD GHB EST-MCNC: 126 MG/DL
GLOBULIN SER CALC-MCNC: 3.1 G/DL (ref 2–4)
GLUCOSE SERPL-MCNC: 88 MG/DL (ref 65–100)
HBA1C MFR BLD: 6 % (ref 4–5.6)
HCT VFR BLD AUTO: 45 % (ref 36.6–50.3)
HDLC SERPL-MCNC: 48 MG/DL
HDLC SERPL: 4.6 (ref 0–5)
HGB BLD-MCNC: 14.5 G/DL (ref 12.1–17)
LDLC SERPL CALC-MCNC: 123 MG/DL (ref 0–100)
MCH RBC QN AUTO: 29.7 PG (ref 26–34)
MCHC RBC AUTO-ENTMCNC: 32.2 G/DL (ref 30–36.5)
MCV RBC AUTO: 92.2 FL (ref 80–99)
NRBC # BLD: 0 K/UL (ref 0–0.01)
NRBC BLD-RTO: 0 PER 100 WBC
PLATELET # BLD AUTO: 313 K/UL (ref 150–400)
PMV BLD AUTO: 10.3 FL (ref 8.9–12.9)
POTASSIUM SERPL-SCNC: 4.1 MMOL/L (ref 3.5–5.1)
PROT SERPL-MCNC: 7.5 G/DL (ref 6.4–8.2)
RBC # BLD AUTO: 4.88 M/UL (ref 4.1–5.7)
SODIUM SERPL-SCNC: 137 MMOL/L (ref 136–145)
TRIGL SERPL-MCNC: 240 MG/DL
TSH SERPL DL<=0.05 MIU/L-ACNC: 0.33 UIU/ML (ref 0.36–3.74)
VLDLC SERPL CALC-MCNC: 48 MG/DL
WBC # BLD AUTO: 8.2 K/UL (ref 4.1–11.1)

## 2023-10-14 DIAGNOSIS — I25.10 CORONARY ARTERY DISEASE INVOLVING NATIVE CORONARY ARTERY OF NATIVE HEART WITHOUT ANGINA PECTORIS: Primary | ICD-10-CM

## 2023-10-14 RX ORDER — ATORVASTATIN CALCIUM 80 MG/1
80 TABLET, FILM COATED ORAL NIGHTLY
Qty: 90 TABLET | Refills: 1 | Status: SHIPPED | OUTPATIENT
Start: 2023-10-14

## 2023-11-13 DIAGNOSIS — I25.10 ATHEROSCLEROTIC HEART DISEASE OF NATIVE CORONARY ARTERY WITHOUT ANGINA PECTORIS: ICD-10-CM

## 2023-11-13 DIAGNOSIS — E03.9 HYPOTHYROIDISM, UNSPECIFIED: ICD-10-CM

## 2023-11-15 NOTE — TELEPHONE ENCOUNTER
Medication Refill Request    Minus  is requesting a refill of the following medication(s):   Requested Prescriptions     Pending Prescriptions Disp Refills    levothyroxine (SYNTHROID) 150 MCG tablet [Pharmacy Med Name: LEVOTHYROXINE 150 MCG TABLET] 90 tablet 0     Sig: TAKE 1 TABLET BY MOUTH EVERY DAY BEFORE BREAKFAST      Last provider to prescribe medication: Dr. Essence Fraga  Last Date of Medication Prescribed: 08/10/2023   Last Office Visit Date: 09/21/2023  Lab Results   Component Value Date    TSH 1.35 10/03/2022    ZRF4FKG 0.33 (L) 09/21/2023       Please send refill to:    [facility]pharm[facility] #[id number][facility]  [phone]  [address] [place] [address]  Phone: [phone] Fax: [phone]

## 2023-11-17 NOTE — TELEPHONE ENCOUNTER
Pt called requesting an updat liseth his refill. Pt also stated he needed a refill on his plavix. Pt stated he is completely out of both. Thank you!

## 2023-11-21 NOTE — TELEPHONE ENCOUNTER
[facility] pharmacy called requesting a refill on Levothyroxine. Would like for prescription to be sent in at your earliest convenience. Thanks!

## 2023-11-22 RX ORDER — CLOPIDOGREL BISULFATE 75 MG/1
75 TABLET ORAL DAILY
Qty: 90 TABLET | Refills: 2 | Status: SHIPPED | OUTPATIENT
Start: 2023-11-22

## 2023-11-22 RX ORDER — LEVOTHYROXINE SODIUM 137 UG/1
137 TABLET ORAL
Qty: 90 TABLET | Refills: 0 | Status: SHIPPED | OUTPATIENT
Start: 2023-11-22

## 2023-11-22 NOTE — TELEPHONE ENCOUNTER
This writer attempted to contact patient via phone in reference to labs results and recommendations per Dr. Igor Soto. This writer was not able to reach patient at this time. A result letter was composed and sent to patient via mail.

## 2023-11-22 NOTE — TELEPHONE ENCOUNTER
Made several attempts to get in touch with patient to discuss lab results and proper administration of Levothyroxine. Will Rx dose reduced Levothyroxine at this time as TSH was low. Will send in Rx for Plavix as well.

## 2024-01-07 DIAGNOSIS — I25.10 ATHEROSCLEROTIC HEART DISEASE OF NATIVE CORONARY ARTERY WITHOUT ANGINA PECTORIS: ICD-10-CM

## 2024-01-11 NOTE — TELEPHONE ENCOUNTER
Medication Refill Request    Ladan Albarado is requesting a refill of the following medication(s):   Requested Prescriptions     Pending Prescriptions Disp Refills    metoprolol tartrate (LOPRESSOR) 25 MG tablet [Pharmacy Med Name: METOPROLOL TARTRATE 25 MG TAB] 180 tablet 0     Sig: TAKE 1 TABLET BY MOUTH EVERY 12 HOURS        Listed PCP is Megan Schwab MD   Last provider to prescribe medication: Dr. Schwab  Last Date of Medication Prescribed: 08/02/2023   Date of Last Office Visit at [facility]: 09/21/2023   Last Labs:  Lab Results   Component Value Date     09/21/2023    K 4.1 09/21/2023     09/21/2023    CO2 28 09/21/2023    BUN 14 09/21/2023    CREATININE 0.85 09/21/2023    GLUCOSE 88 09/21/2023    CALCIUM 9.4 09/21/2023    PROT 7.5 09/21/2023    LABALBU 4.4 09/21/2023    BILITOT 0.5 09/21/2023    ALKPHOS 61 09/21/2023    AST 29 09/21/2023    ALT 46 09/21/2023    LABGLOM >60 09/21/2023    GFRAA >60 04/29/2022    AGRATIO 1.4 10/03/2022    GLOB 3.1 09/21/2023         Future Appointment:   Future Appointments   Date Time Provider Department Center   2/5/2024  1:20 PM Megan Schwab MD [facility] BS AMB       Refill Request Note: N/A    Please send refill to:    [facility]/pharmacy #[id number] [place], [place] - [address] [facility] -  [phone] - F [phone] [address] [facility] [address]  Phone: [phone] Fax: [phone]

## 2024-02-05 ENCOUNTER — OFFICE VISIT (OUTPATIENT)
Age: 44
End: 2024-02-05
Payer: COMMERCIAL

## 2024-02-05 VITALS
RESPIRATION RATE: 16 BRPM | DIASTOLIC BLOOD PRESSURE: 77 MMHG | BODY MASS INDEX: 29.76 KG/M2 | SYSTOLIC BLOOD PRESSURE: 117 MMHG | WEIGHT: 213.4 LBS | TEMPERATURE: 97.3 F | HEART RATE: 62 BPM | OXYGEN SATURATION: 98 %

## 2024-02-05 DIAGNOSIS — E03.9 ACQUIRED HYPOTHYROIDISM: ICD-10-CM

## 2024-02-05 DIAGNOSIS — L91.8 SKIN TAG: ICD-10-CM

## 2024-02-05 DIAGNOSIS — I25.10 CORONARY ARTERY DISEASE INVOLVING NATIVE CORONARY ARTERY OF NATIVE HEART WITHOUT ANGINA PECTORIS: Primary | ICD-10-CM

## 2024-02-05 DIAGNOSIS — R73.03 PREDIABETES: ICD-10-CM

## 2024-02-05 PROCEDURE — 11200 RMVL SKIN TAGS UP TO&INC 15: CPT | Performed by: STUDENT IN AN ORGANIZED HEALTH CARE EDUCATION/TRAINING PROGRAM

## 2024-02-05 PROCEDURE — 93010 ELECTROCARDIOGRAM REPORT: CPT | Performed by: STUDENT IN AN ORGANIZED HEALTH CARE EDUCATION/TRAINING PROGRAM

## 2024-02-05 PROCEDURE — 99214 OFFICE O/P EST MOD 30 MIN: CPT | Performed by: STUDENT IN AN ORGANIZED HEALTH CARE EDUCATION/TRAINING PROGRAM

## 2024-02-05 PROCEDURE — 93005 ELECTROCARDIOGRAM TRACING: CPT | Performed by: STUDENT IN AN ORGANIZED HEALTH CARE EDUCATION/TRAINING PROGRAM

## 2024-02-05 ASSESSMENT — PATIENT HEALTH QUESTIONNAIRE - PHQ9
SUM OF ALL RESPONSES TO PHQ QUESTIONS 1-9: 0
SUM OF ALL RESPONSES TO PHQ QUESTIONS 1-9: 0
1. LITTLE INTEREST OR PLEASURE IN DOING THINGS: 0
SUM OF ALL RESPONSES TO PHQ QUESTIONS 1-9: 0
2. FEELING DOWN, DEPRESSED OR HOPELESS: 0
SUM OF ALL RESPONSES TO PHQ QUESTIONS 1-9: 0
SUM OF ALL RESPONSES TO PHQ9 QUESTIONS 1 & 2: 0

## 2024-02-05 NOTE — PROGRESS NOTES
[address] [place] [address]   Office [phone], Fax [phone]    Subjective   CC:  Ladan Albarado is a 43 y.o. male who presents for f/up chronic conditions.     Arm pain:  - Has been exercising more - he has lost 3# of fat per body composition eval  - Working w   - He noticed he was having more L shoulder pain w radiation to L arm - this has happened before, but never lasted this long  - Reminiscent of his prev MI  - Feels more likely r/t muscle sprain/strain  - No CP, palp, SHELTON    CAD:  - Diet: has cut out all junk food, eating healthy - plenty of fruits/veggies/proteins  - Occ puffs of cigarettes  - Drinks alcohol on weekends only    Hypothyroidism:  - Taking Levothyroxine as Rx'ed    Skin tags:  - Interested in removal of axillary skin tags today      Complete ROS performed and pertinent positives/negatives are documented in HPI.    Past Medical History  Past Medical History:   Diagnosis Date    High cholesterol     NSTEMI (non-ST elevated myocardial infarction) (HCC) 04/15/2019    Thyroid disease     Tobacco abuse 4/15/2019       Current Medications  Current Outpatient Medications   Medication Sig Dispense Refill    metoprolol tartrate (LOPRESSOR) 25 MG tablet Take 1 tablet by mouth in the morning and 1 tablet in the evening. 180 tablet 1    levothyroxine (SYNTHROID) 137 MCG tablet Take 1 tablet by mouth every morning (before breakfast) Hypothyroidism, unspecified [E03.9] 90 tablet 0    clopidogrel (PLAVIX) 75 MG tablet Take 1 tablet by mouth daily 90 tablet 2    atorvastatin (LIPITOR) 80 MG tablet Take 1 tablet by mouth nightly (Patient taking differently: Take 0.5 tablets by mouth nightly) 90 tablet 1     No current facility-administered medications for this visit.       Allergies  Allergies   Allergen Reactions    Aspirin Hives    Oregano (Origanum Vulgare) [Origanum Oil] Hives     Objective   Vital Signs  /77 (Site: Right Upper Arm, Position: Sitting, Cuff

## 2024-02-05 NOTE — PROGRESS NOTES
Room 21    Identified pt with two pt identifiers(name and ). Reviewed record in preparation for visit and have obtained necessary documentation.  Chief Complaint   Patient presents with    Follow-up Chronic Condition    Skin Tag        Health Maintenance Due   Topic    Hepatitis B vaccine (1 of 3 - 3-dose series)    Varicella vaccine (1 of 2 - 2-dose childhood series)    DTaP/Tdap/Td vaccine (1 - Tdap)    Flu vaccine (1)    COVID-19 Vaccine (3  season)       Vitals:    24 1302   BP: 117/77   Site: Right Upper Arm   Position: Sitting   Cuff Size: Medium Adult   Pulse: 62   Resp: 16   Temp: 97.3 °F (36.3 °C)   TempSrc: Oral   SpO2: 98%   Weight: 96.8 kg (213 lb 6.4 oz)         Coordination of Care Questionnaire:  :   --Have you been to an emergency room, urgent care, or hospitalized since your last visit?  If yes, where when, and reason for visit? no      --Have seen or consulted any other health care provider since your last visit? No  If yes, where when, and reason for visit?       Patient is accompanied by self.

## 2024-02-06 LAB
CHOLEST SERPL-MCNC: 163 MG/DL
EST. AVERAGE GLUCOSE BLD GHB EST-MCNC: 123 MG/DL
HBA1C MFR BLD: 5.9 % (ref 4–5.6)
HDLC SERPL-MCNC: 47 MG/DL
HDLC SERPL: 3.5 (ref 0–5)
LDLC SERPL CALC-MCNC: 82.4 MG/DL (ref 0–100)
TRIGL SERPL-MCNC: 168 MG/DL
VLDLC SERPL CALC-MCNC: 33.6 MG/DL

## 2024-02-07 LAB — TSH SERPL DL<=0.05 MIU/L-ACNC: 3.3 UIU/ML (ref 0.45–4.5)

## 2024-02-09 NOTE — PROGRESS NOTES
PROCEDURE NOTE - Cryotherapy of axillary skin tags    Chart reviewed for the following:  Megan PALACIOS MD, have reviewed the History, Physical and updated the Allergic reactions for Ladan Albarado     TIME OUT performed immediately prior to start of procedure:  Megan PALACIOS MD, have performed the following reviews on Ladan Albarado prior to the start of the procedure:            * Patient was identified by name and date of birth   * Agreement on procedure being performed was verified  * Risks and Benefits explained to the patient  * Procedure site verified and marked as necessary  * Patient was positioned for comfort  * Consent was signed and verified     Time: 1:56 PM    Date of procedure: 2/5/24    Procedure performed by:  Megan Schwab MD    Patient assisted by: self    How tolerated by patient: well    Procedure in detail: Liquid nitrogen applied for 35 seconds x 2 cycles to left axillary skin tag, multiple additional skin tags with 30 seconds of application x 1 cycle of bilateral axilla (5 total).

## 2024-02-09 NOTE — PROGRESS NOTES
I reviewed with the resident the medical history and the resident's findings on the physical examination.  I discussed with the resident the patient's diagnosis and concur with the plan.     Uma Salmon MD 2/9/2024

## 2024-02-22 DIAGNOSIS — E03.9 HYPOTHYROIDISM, UNSPECIFIED: ICD-10-CM

## 2024-02-25 RX ORDER — LEVOTHYROXINE SODIUM 137 UG/1
137 TABLET ORAL
Qty: 30 TABLET | Refills: 2 | Status: SHIPPED | OUTPATIENT
Start: 2024-02-25

## 2024-02-29 ENCOUNTER — OFFICE VISIT (OUTPATIENT)
Age: 44
End: 2024-02-29
Payer: COMMERCIAL

## 2024-02-29 VITALS
DIASTOLIC BLOOD PRESSURE: 79 MMHG | OXYGEN SATURATION: 97 % | RESPIRATION RATE: 18 BRPM | HEART RATE: 68 BPM | HEIGHT: 71 IN | TEMPERATURE: 97.9 F | SYSTOLIC BLOOD PRESSURE: 122 MMHG | WEIGHT: 214 LBS | BODY MASS INDEX: 29.96 KG/M2

## 2024-02-29 DIAGNOSIS — L91.8 SKIN TAG: ICD-10-CM

## 2024-02-29 DIAGNOSIS — S43.422A SPRAIN OF LEFT ROTATOR CUFF CAPSULE, INITIAL ENCOUNTER: Primary | ICD-10-CM

## 2024-02-29 PROCEDURE — 99213 OFFICE O/P EST LOW 20 MIN: CPT | Performed by: STUDENT IN AN ORGANIZED HEALTH CARE EDUCATION/TRAINING PROGRAM

## 2024-02-29 PROCEDURE — 11200 RMVL SKIN TAGS UP TO&INC 15: CPT | Performed by: STUDENT IN AN ORGANIZED HEALTH CARE EDUCATION/TRAINING PROGRAM

## 2024-02-29 NOTE — PROGRESS NOTES
[address] [place] [address]   Office [phone], Fax [phone]    Subjective   CC:  Ladan Albarado is a 43 y.o. male who presents for skin tags    Skin tags:  - Present on shoulders, axilla  - Some came off with last treatment  - Has two that had not fallen off    Shoulder pain:  - Ongoing  - Thinks d/t working out  - No injury/trauma  - Trainer has been having him lift heavier weights    Complete ROS performed and pertinent positives/negatives are documented in HPI.    Past Medical History  Past Medical History:   Diagnosis Date    High cholesterol     NSTEMI (non-ST elevated myocardial infarction) (HCC) 04/15/2019    Thyroid disease     Tobacco abuse 4/15/2019       Current Medications  Current Outpatient Medications   Medication Sig Dispense Refill    levothyroxine (SYNTHROID) 137 MCG tablet TAKE 1 TABLET BY MOUTH EVERY MORNING (BEFORE BREAKFAST) 30 tablet 2    metoprolol tartrate (LOPRESSOR) 25 MG tablet Take 1 tablet by mouth in the morning and 1 tablet in the evening. 180 tablet 1    clopidogrel (PLAVIX) 75 MG tablet Take 1 tablet by mouth daily 90 tablet 2    atorvastatin (LIPITOR) 80 MG tablet Take 1 tablet by mouth nightly 90 tablet 1     No current facility-administered medications for this visit.       Allergies  Allergies   Allergen Reactions    Aspirin Hives    Oregano (Origanum Vulgare) [Origanum Oil] Hives     Objective   Vital Signs  /79 (Site: Right Upper Arm, Position: Sitting, Cuff Size: Large Adult)   Pulse 68   Temp 97.9 °F (36.6 °C) (Oral)   Resp 18   Ht 1.803 m (5' 11\")   Wt 97.1 kg (214 lb)   SpO2 97%   BMI 29.85 kg/m²     Physical Examination  Physical Exam  Constitutional:       General: He is not in acute distress.     Appearance: Normal appearance.   HENT:      Head: Normocephalic and atraumatic.   Cardiovascular:      Rate and Rhythm: Normal rate and regular rhythm.      Heart sounds: No murmur heard.  Pulmonary:      Effort: Pulmonary effort is

## 2024-02-29 NOTE — PROGRESS NOTES
Patient has been identified by name and .    Chief Complaint   Patient presents with    Skin Tag     Pt reports to F/U on Skin tag removal       Vitals:    24 0802   BP: 122/79   Site: Right Upper Arm   Position: Sitting   Cuff Size: Large Adult   Pulse: 68   Resp: 18   Temp: 97.9 °F (36.6 °C)   TempSrc: Oral   SpO2: 97%   Weight: 97.1 kg (214 lb)   Height: 1.803 m (5' 11\")        \"Have you been to the ER, urgent care clinic since your last visit?  Hospitalized since your last visit?\"    NO    “Have you seen or consulted any other health care providers outside of [facility] since your last visit?”    NO

## 2024-05-17 ENCOUNTER — PROCEDURE VISIT (OUTPATIENT)
Age: 44
End: 2024-05-17
Payer: COMMERCIAL

## 2024-05-17 VITALS
OXYGEN SATURATION: 96 % | TEMPERATURE: 98.1 F | SYSTOLIC BLOOD PRESSURE: 102 MMHG | HEIGHT: 71 IN | BODY MASS INDEX: 29.93 KG/M2 | RESPIRATION RATE: 18 BRPM | DIASTOLIC BLOOD PRESSURE: 60 MMHG | WEIGHT: 213.8 LBS | HEART RATE: 66 BPM

## 2024-05-17 DIAGNOSIS — M79.602 LEFT ARM PAIN: Primary | ICD-10-CM

## 2024-05-17 DIAGNOSIS — L91.8 ACROCHORDON: ICD-10-CM

## 2024-05-17 DIAGNOSIS — E03.9 ACQUIRED HYPOTHYROIDISM: ICD-10-CM

## 2024-05-17 DIAGNOSIS — I25.10 CORONARY ARTERY DISEASE INVOLVING NATIVE CORONARY ARTERY OF NATIVE HEART WITHOUT ANGINA PECTORIS: ICD-10-CM

## 2024-05-17 PROCEDURE — 17110 DESTRUCTION B9 LES UP TO 14: CPT | Performed by: STUDENT IN AN ORGANIZED HEALTH CARE EDUCATION/TRAINING PROGRAM

## 2024-05-17 PROCEDURE — 99214 OFFICE O/P EST MOD 30 MIN: CPT | Performed by: STUDENT IN AN ORGANIZED HEALTH CARE EDUCATION/TRAINING PROGRAM

## 2024-05-17 PROCEDURE — 93010 ELECTROCARDIOGRAM REPORT: CPT | Performed by: STUDENT IN AN ORGANIZED HEALTH CARE EDUCATION/TRAINING PROGRAM

## 2024-05-17 PROCEDURE — 93005 ELECTROCARDIOGRAM TRACING: CPT | Performed by: STUDENT IN AN ORGANIZED HEALTH CARE EDUCATION/TRAINING PROGRAM

## 2024-05-17 RX ORDER — CLOPIDOGREL BISULFATE 75 MG/1
75 TABLET ORAL DAILY
Qty: 90 TABLET | Refills: 2 | Status: SHIPPED | OUTPATIENT
Start: 2024-05-17

## 2024-05-17 RX ORDER — LEVOTHYROXINE SODIUM 137 UG/1
137 TABLET ORAL
Qty: 30 TABLET | Refills: 2 | Status: SHIPPED | OUTPATIENT
Start: 2024-05-17

## 2024-05-17 ASSESSMENT — PATIENT HEALTH QUESTIONNAIRE - PHQ9
2. FEELING DOWN, DEPRESSED OR HOPELESS: NOT AT ALL
SUM OF ALL RESPONSES TO PHQ9 QUESTIONS 1 & 2: 0
SUM OF ALL RESPONSES TO PHQ QUESTIONS 1-9: 0
1. LITTLE INTEREST OR PLEASURE IN DOING THINGS: NOT AT ALL

## 2024-05-17 NOTE — PATIENT INSTRUCTIONS
Deepali PT at home: [email or web address]

## 2024-05-17 NOTE — PROGRESS NOTES
[address] [place] [id number]   Office [phone], Fax [phone]    Subjective   CC:  Ladan Albarado is a 43 y.o. male who presents for left arm numbness and     Arm numbness:  Left arm  W radiation down arm  Does not radiate to jaw/neck/cheest  No CP  No SHELTON  Not worse w exertion    Skin tags:   Has about 5 he would like to have cryo done for today  Bothersome, irritates him roxann w clothing      Complete ROS performed and pertinent positives/negatives are documented in HPI.    Past Medical History  Past Medical History:   Diagnosis Date    High cholesterol     NSTEMI (non-ST elevated myocardial infarction) (HCC) 04/15/2019    Thyroid disease     Tobacco abuse 4/15/2019       Current Medications  Current Outpatient Medications   Medication Sig Dispense Refill    levothyroxine (SYNTHROID) 137 MCG tablet TAKE 1 TABLET BY MOUTH EVERY MORNING (BEFORE BREAKFAST) 30 tablet 2    metoprolol tartrate (LOPRESSOR) 25 MG tablet Take 1 tablet by mouth in the morning and 1 tablet in the evening. 180 tablet 1    clopidogrel (PLAVIX) 75 MG tablet Take 1 tablet by mouth daily 90 tablet 2    atorvastatin (LIPITOR) 80 MG tablet Take 1 tablet by mouth nightly 90 tablet 1     No current facility-administered medications for this visit.       Allergies  Allergies   Allergen Reactions    Aspirin Hives    Oregano (Origanum Vulgare) [Origanum Oil] Hives       Objective   Vital Signs  /60 (Site: Right Upper Arm, Position: Sitting, Cuff Size: Medium Adult)   Pulse 66   Temp 98.1 °F (36.7 °C) (Oral)   Resp 18   Ht 1.803 m (5' 11\")   Wt 97 kg (213 lb 12.8 oz)   SpO2 96%   BMI 29.82 kg/m²     Physical Examination  Physical Exam  Constitutional:       General: He is not in acute distress.     Appearance: Normal appearance.   HENT:      Head: Normocephalic and atraumatic.   Cardiovascular:      Rate and Rhythm: Normal rate and regular rhythm.      Heart sounds: No murmur heard.  Pulmonary:      Effort: Pulmonary
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
## 2024-05-22 ENCOUNTER — NURSE ONLY (OUTPATIENT)
Age: 44
End: 2024-05-22

## 2024-05-22 DIAGNOSIS — I25.10 CORONARY ARTERY DISEASE INVOLVING NATIVE CORONARY ARTERY OF NATIVE HEART WITHOUT ANGINA PECTORIS: Primary | ICD-10-CM

## 2024-05-22 LAB
CHOLEST SERPL-MCNC: 132 MG/DL
HDLC SERPL-MCNC: 52 MG/DL
HDLC SERPL: 2.5 (ref 0–5)
LDLC SERPL CALC-MCNC: 53.4 MG/DL (ref 0–100)
TRIGL SERPL-MCNC: 133 MG/DL
VLDLC SERPL CALC-MCNC: 26.6 MG/DL

## 2024-07-25 ENCOUNTER — TELEPHONE (OUTPATIENT)
Age: 44
End: 2024-07-25

## 2024-07-25 DIAGNOSIS — I25.10 CORONARY ARTERY DISEASE INVOLVING NATIVE CORONARY ARTERY OF NATIVE HEART WITHOUT ANGINA PECTORIS: ICD-10-CM

## 2024-07-25 NOTE — TELEPHONE ENCOUNTER
Medication Refill Request    Ladan Albarado is requesting a refill of the following medication(s):   Requested Prescriptions      No prescriptions requested or ordered in this encounter        Listed PCP is Clinton Garcia DO   Last provider to prescribe medication: Dr. Schwab  Last Date of Medication Prescribed: 10/14/23   Date of Last Office Visit at [facility]: 02/29/24   FUTURE APPOINTMENT:   Future Appointments   Date Time Provider Department Center   8/8/2024  8:20 AM Clinton Garcia DO [facility] BS AMB       Please send refill to:    [facility]/pharmacy #[id number] - [place], [place] - [address] [facility] -  [phone] - F [phone]  [address] [place] [address]  Phone: [phone] Fax: [phone]      Please review request and approve or deny with recommendations.

## 2024-07-25 NOTE — TELEPHONE ENCOUNTER
Pt called to request a refill of atorvastatin (LIPITOR) 80 MG tablet. If appropriate, please send to [facility]/pharmacy #[id number] - [place] - [address] [facility] -  [phone] - f [phone]

## 2024-07-26 RX ORDER — ATORVASTATIN CALCIUM 80 MG/1
80 TABLET, FILM COATED ORAL NIGHTLY
Qty: 90 TABLET | Refills: 1 | Status: SHIPPED | OUTPATIENT
Start: 2024-07-26

## 2024-08-08 ENCOUNTER — OFFICE VISIT (OUTPATIENT)
Age: 44
End: 2024-08-08
Payer: COMMERCIAL

## 2024-08-08 VITALS
TEMPERATURE: 97.5 F | RESPIRATION RATE: 18 BRPM | SYSTOLIC BLOOD PRESSURE: 117 MMHG | BODY MASS INDEX: 29.62 KG/M2 | WEIGHT: 211.6 LBS | HEART RATE: 69 BPM | OXYGEN SATURATION: 94 % | HEIGHT: 71 IN | DIASTOLIC BLOOD PRESSURE: 79 MMHG

## 2024-08-08 DIAGNOSIS — R73.03 PREDIABETES: ICD-10-CM

## 2024-08-08 DIAGNOSIS — I25.10 CORONARY ARTERY DISEASE INVOLVING NATIVE CORONARY ARTERY OF NATIVE HEART WITHOUT ANGINA PECTORIS: Primary | ICD-10-CM

## 2024-08-08 DIAGNOSIS — E03.9 ACQUIRED HYPOTHYROIDISM: ICD-10-CM

## 2024-08-08 LAB
ERYTHROCYTE [DISTWIDTH] IN BLOOD BY AUTOMATED COUNT: 12.2 % (ref 11.5–14.5)
EST. AVERAGE GLUCOSE BLD GHB EST-MCNC: 123 MG/DL
HBA1C MFR BLD: 5.9 % (ref 4–5.6)
HCT VFR BLD AUTO: 46.4 % (ref 36.6–50.3)
HGB BLD-MCNC: 14.9 G/DL (ref 12.1–17)
MCH RBC QN AUTO: 29.4 PG (ref 26–34)
MCHC RBC AUTO-ENTMCNC: 32.1 G/DL (ref 30–36.5)
MCV RBC AUTO: 91.5 FL (ref 80–99)
NRBC # BLD: 0 K/UL (ref 0–0.01)
NRBC BLD-RTO: 0 PER 100 WBC
PLATELET # BLD AUTO: 267 K/UL (ref 150–400)
PMV BLD AUTO: 10.1 FL (ref 8.9–12.9)
RBC # BLD AUTO: 5.07 M/UL (ref 4.1–5.7)
TSH SERPL DL<=0.05 MIU/L-ACNC: 1.1 UIU/ML (ref 0.36–3.74)
WBC # BLD AUTO: 7.8 K/UL (ref 4.1–11.1)

## 2024-08-08 PROCEDURE — 99214 OFFICE O/P EST MOD 30 MIN: CPT

## 2024-08-08 RX ORDER — METOPROLOL SUCCINATE 25 MG/1
25 TABLET, EXTENDED RELEASE ORAL DAILY
Qty: 90 TABLET | Refills: 0 | Status: SHIPPED | OUTPATIENT
Start: 2024-08-08

## 2024-08-08 RX ORDER — ATORVASTATIN CALCIUM 80 MG/1
80 TABLET, FILM COATED ORAL NIGHTLY
Qty: 90 TABLET | Refills: 3 | Status: SHIPPED | OUTPATIENT
Start: 2024-08-08

## 2024-08-08 RX ORDER — CLOPIDOGREL BISULFATE 75 MG/1
75 TABLET ORAL DAILY
Qty: 90 TABLET | Refills: 3 | Status: SHIPPED | OUTPATIENT
Start: 2024-08-08

## 2024-08-08 ASSESSMENT — PATIENT HEALTH QUESTIONNAIRE - PHQ9
1. LITTLE INTEREST OR PLEASURE IN DOING THINGS: NOT AT ALL
SUM OF ALL RESPONSES TO PHQ9 QUESTIONS 1 & 2: 0
SUM OF ALL RESPONSES TO PHQ QUESTIONS 1-9: 0
2. FEELING DOWN, DEPRESSED OR HOPELESS: NOT AT ALL
SUM OF ALL RESPONSES TO PHQ QUESTIONS 1-9: 0

## 2024-08-08 NOTE — PROGRESS NOTES
Chief Complaint   Patient presents with    Follow-up Chronic Condition     Vitals:    08/08/24 0826   BP: 117/79   Site: Right Upper Arm   Position: Sitting   Cuff Size: Large Adult   Pulse: 69   Resp: 18   Temp: 97.5 °F (36.4 °C)   TempSrc: Temporal   SpO2: 94%   Weight: 96 kg (211 lb 9.6 oz)   Height: 1.803 m (5' 11\")     \"Have you been to the ER, urgent care clinic since your last visit?  Hospitalized since your last visit?\"    NO    “Have you seen or consulted any other health care providers outside of [facility] since your last visit?”    NO            Click Here for Release of Records Request

## 2024-08-08 NOTE — PROGRESS NOTES
[address] [place] [address]   Office [phone], Fax [phone]   Subjective     Chief Complaint   Patient presents with    Follow-up Chronic Condition      Ladan Albarado is a 43 y.o. male with CAD, hypothyroidism, HTN who presents for the above.    Taking lipitor, plavix, synthroid, and lopressor. Doing well on all of these.    Cards: [place]. No recent follow up  - Stress test 3 years ago  - Heart attack with one stent 5 years ago  - No CP/SOB    Exercising regularly, 5 times per day, eating healthy.    No acute concerns, asking about if he needs to take all of his medications with goal of decreasing pill burden.    Past Medical History  Past Medical History:   Diagnosis Date    High cholesterol     NSTEMI (non-ST elevated myocardial infarction) (HCC) 04/15/2019    Thyroid disease     Tobacco abuse 4/15/2019       Current Medications  Current Outpatient Medications   Medication Sig Dispense Refill    levothyroxine (SYNTHROID) 137 MCG tablet Take 1 tablet by mouth every morning (before breakfast) 90 tablet 3    metoprolol succinate (TOPROL XL) 25 MG extended release tablet Take 1 tablet by mouth daily 90 tablet 0    atorvastatin (LIPITOR) 80 MG tablet Take 1 tablet by mouth nightly 90 tablet 3    clopidogrel (PLAVIX) 75 MG tablet Take 1 tablet by mouth daily 90 tablet 3     No current facility-administered medications for this visit.       Objective   Vital Signs  /79 (Site: Right Upper Arm, Position: Sitting, Cuff Size: Large Adult)   Pulse 69   Temp 97.5 °F (36.4 °C) (Temporal)   Resp 18   Ht 1.803 m (5' 11\")   Wt 96 kg (211 lb 9.6 oz)   SpO2 94%   BMI 29.51 kg/m²     Physical Examination  Cardio: Regular rate/rhythm, no murmurs  Pulm: Clear b/l, no wheezing, no crackles, no ronchi    Assessment and Plan   Ladan Albarado is a 43 y.o. with CAD, Hypothyroidism, prediabetes who is here for chronic condition follow up with no concerns. Continuing current medication

## 2024-08-09 RX ORDER — LEVOTHYROXINE SODIUM 137 UG/1
137 TABLET ORAL
Qty: 90 TABLET | Refills: 3 | Status: SHIPPED | OUTPATIENT
Start: 2024-08-09

## 2024-11-17 NOTE — PROGRESS NOTES
CMP wnl, proBNP 42 (wnl), TSH wnl, A1c bumped to 6.2% - likely in setting of dietary indiscretions w recent travel - discussed w pt importance of routine diet and exercise - will recheck in 3 months. Sign off to TNTL

## 2024-11-29 DIAGNOSIS — I25.10 CORONARY ARTERY DISEASE INVOLVING NATIVE CORONARY ARTERY OF NATIVE HEART WITHOUT ANGINA PECTORIS: ICD-10-CM

## 2024-12-04 RX ORDER — METOPROLOL SUCCINATE 25 MG/1
25 TABLET, EXTENDED RELEASE ORAL DAILY
Qty: 90 TABLET | Refills: 3 | Status: SHIPPED | OUTPATIENT
Start: 2024-12-04

## 2025-04-10 ENCOUNTER — OFFICE VISIT (OUTPATIENT)
Age: 45
End: 2025-04-10
Payer: COMMERCIAL

## 2025-04-10 VITALS
HEART RATE: 78 BPM | OXYGEN SATURATION: 96 % | TEMPERATURE: 98 F | DIASTOLIC BLOOD PRESSURE: 65 MMHG | WEIGHT: 218 LBS | SYSTOLIC BLOOD PRESSURE: 99 MMHG | BODY MASS INDEX: 30.4 KG/M2

## 2025-04-10 DIAGNOSIS — I25.10 CORONARY ARTERY DISEASE INVOLVING NATIVE CORONARY ARTERY OF NATIVE HEART WITHOUT ANGINA PECTORIS: Primary | ICD-10-CM

## 2025-04-10 DIAGNOSIS — R73.03 PREDIABETES: ICD-10-CM

## 2025-04-10 DIAGNOSIS — E03.9 HYPOTHYROIDISM, UNSPECIFIED TYPE: ICD-10-CM

## 2025-04-10 PROCEDURE — 99213 OFFICE O/P EST LOW 20 MIN: CPT

## 2025-04-10 SDOH — ECONOMIC STABILITY: FOOD INSECURITY: WITHIN THE PAST 12 MONTHS, YOU WORRIED THAT YOUR FOOD WOULD RUN OUT BEFORE YOU GOT MONEY TO BUY MORE.: NEVER TRUE

## 2025-04-10 SDOH — ECONOMIC STABILITY: FOOD INSECURITY: WITHIN THE PAST 12 MONTHS, THE FOOD YOU BOUGHT JUST DIDN'T LAST AND YOU DIDN'T HAVE MONEY TO GET MORE.: NEVER TRUE

## 2025-04-10 ASSESSMENT — PATIENT HEALTH QUESTIONNAIRE - PHQ9
1. LITTLE INTEREST OR PLEASURE IN DOING THINGS: NOT AT ALL
SUM OF ALL RESPONSES TO PHQ QUESTIONS 1-9: 0

## 2025-04-10 NOTE — PROGRESS NOTES
I discussed the findings, assessment and plan with the resident and agree with the resident's findings and plan as documented in the resident's note.    Zenobia Floyd MD

## 2025-04-10 NOTE — PROGRESS NOTES
[address] [place] [id number]   Office [phone], Fax [phone]   Subjective     Chief Complaint   Patient presents with    Follow-up Chronic Condition     Routine labs? No new concerns      Ladan Albarado is a 44 y.o. male with CAD, hypothyroidism, HTN who presents for the above.    Taking lipitor, plavix, synthroid, and lopressor. Doing well on all of these.    Cards: [place]ologist of [place]. No recent follow up  - Stress test 3 years ago  - Heart attack with one stent 5 years ago  - No CP/SOB    Exercising regularly, 5 times per day, eating healthy.    No acute concerns, okay with getting lab work today.    Past Medical History  Past Medical History:   Diagnosis Date    High cholesterol     NSTEMI (non-ST elevated myocardial infarction) (HCC) 04/15/2019    Thyroid disease     Tobacco abuse 4/15/2019       Current Medications  Current Outpatient Medications   Medication Sig Dispense Refill    metoprolol succinate (TOPROL XL) 25 MG extended release tablet TAKE 1 TABLET BY MOUTH EVERY DAY 90 tablet 3    levothyroxine (SYNTHROID) 137 MCG tablet Take 1 tablet by mouth every morning (before breakfast) 90 tablet 3    atorvastatin (LIPITOR) 80 MG tablet Take 1 tablet by mouth nightly 90 tablet 3    clopidogrel (PLAVIX) 75 MG tablet Take 1 tablet by mouth daily 90 tablet 3     No current facility-administered medications for this visit.       Objective   Vital Signs  BP 99/65   Pulse 78   Temp 98 °F (36.7 °C) (Oral)   Wt 98.9 kg (218 lb)   SpO2 96%   BMI 30.40 kg/m²     Physical Examination  Well appearing, no acute distress  Cardio: Regular rate/rhythm, no murmurs  Pulm: Clear b/l, no wheezing, no crackles, no ronchi    Assessment and Plan   Ladan Albarado is a 44 y.o. with CAD, Hypothyroidism, prediabetes who is here for chronic condition follow up with no concerns. Continuing current medication regimen.Ordering standing lab work for patient to return to clinic to obtain within the next 1-2

## 2025-04-10 NOTE — PROGRESS NOTES
Identified pt with two pt identifiers(name and ). Reviewed record in preparation for visit and have obtained necessary documentation.  Chief Complaint   Patient presents with    Follow-up Chronic Condition     Routine labs? No new concerns        Vitals:    04/10/25 1547   BP: 99/65   Pulse: 78   Temp: 98 °F (36.7 °C)   TempSrc: Oral   SpO2: 96%   Weight: 98.9 kg (218 lb)         Coordination of Care Questionnaire:  :     \"Have you been to the ER, urgent care clinic since your last visit?  Hospitalized since your last visit?\"    NO    “Have you seen or consulted any other health care providers outside of [facility] since your last visit?”    NO            Click Here for Release of Records Request

## 2025-04-16 ENCOUNTER — TELEPHONE (OUTPATIENT)
Age: 45
End: 2025-04-16

## 2025-04-16 ENCOUNTER — LAB (OUTPATIENT)
Age: 45
End: 2025-04-16

## 2025-04-16 DIAGNOSIS — R73.03 PREDIABETES: ICD-10-CM

## 2025-04-16 DIAGNOSIS — I25.10 CORONARY ARTERY DISEASE INVOLVING NATIVE CORONARY ARTERY OF NATIVE HEART WITHOUT ANGINA PECTORIS: ICD-10-CM

## 2025-04-16 NOTE — TELEPHONE ENCOUNTER
Patient here for lab collection and asked for cortisol & testosterone levels checked. Patient also wanted nutrient deficiency panel checked. I explained that we would send a message to the doctor and see what testing the doctor wanted to add.

## 2025-04-17 LAB
ALBUMIN SERPL-MCNC: 4.4 G/DL (ref 3.5–5)
ALBUMIN/GLOB SERPL: 1.4 (ref 1.1–2.2)
ALP SERPL-CCNC: 75 U/L (ref 45–117)
ALT SERPL-CCNC: 42 U/L (ref 12–78)
ANION GAP SERPL CALC-SCNC: 6 MMOL/L (ref 2–12)
AST SERPL-CCNC: 33 U/L (ref 15–37)
BILIRUB SERPL-MCNC: 0.7 MG/DL (ref 0.2–1)
BUN SERPL-MCNC: 15 MG/DL (ref 6–20)
BUN/CREAT SERPL: 18 (ref 12–20)
CALCIUM SERPL-MCNC: 9.5 MG/DL (ref 8.5–10.1)
CHLORIDE SERPL-SCNC: 101 MMOL/L (ref 97–108)
CHOLEST SERPL-MCNC: 123 MG/DL
CO2 SERPL-SCNC: 30 MMOL/L (ref 21–32)
CREAT SERPL-MCNC: 0.82 MG/DL (ref 0.7–1.3)
ERYTHROCYTE [DISTWIDTH] IN BLOOD BY AUTOMATED COUNT: 12.3 % (ref 11.5–14.5)
EST. AVERAGE GLUCOSE BLD GHB EST-MCNC: 123 MG/DL
GLOBULIN SER CALC-MCNC: 3.2 G/DL (ref 2–4)
GLUCOSE SERPL-MCNC: 100 MG/DL (ref 65–100)
HBA1C MFR BLD: 5.9 % (ref 4–5.6)
HCT VFR BLD AUTO: 47.5 % (ref 36.6–50.3)
HDLC SERPL-MCNC: 50 MG/DL
HDLC SERPL: 2.5 (ref 0–5)
HGB BLD-MCNC: 15.2 G/DL (ref 12.1–17)
LDLC SERPL CALC-MCNC: 47 MG/DL (ref 0–100)
MCH RBC QN AUTO: 29.9 PG (ref 26–34)
MCHC RBC AUTO-ENTMCNC: 32 G/DL (ref 30–36.5)
MCV RBC AUTO: 93.3 FL (ref 80–99)
NRBC # BLD: 0 K/UL (ref 0–0.01)
NRBC BLD-RTO: 0 PER 100 WBC
PLATELET # BLD AUTO: 297 K/UL (ref 150–400)
PMV BLD AUTO: 10.8 FL (ref 8.9–12.9)
POTASSIUM SERPL-SCNC: 4.6 MMOL/L (ref 3.5–5.1)
PROT SERPL-MCNC: 7.6 G/DL (ref 6.4–8.2)
RBC # BLD AUTO: 5.09 M/UL (ref 4.1–5.7)
SODIUM SERPL-SCNC: 137 MMOL/L (ref 136–145)
TRIGL SERPL-MCNC: 130 MG/DL
VLDLC SERPL CALC-MCNC: 26 MG/DL
WBC # BLD AUTO: 6.6 K/UL (ref 4.1–11.1)

## 2025-04-22 ENCOUNTER — RESULTS FOLLOW-UP (OUTPATIENT)
Age: 45
End: 2025-04-22

## 2025-06-09 ENCOUNTER — APPOINTMENT (OUTPATIENT)
Facility: HOSPITAL | Age: 45
End: 2025-06-09

## 2025-06-09 ENCOUNTER — HOSPITAL ENCOUNTER (EMERGENCY)
Facility: HOSPITAL | Age: 45
Discharge: HOME | End: 2025-06-09
Attending: STUDENT IN AN ORGANIZED HEALTH CARE EDUCATION/TRAINING PROGRAM

## 2025-06-09 VITALS
BODY MASS INDEX: 29.68 KG/M2 | OXYGEN SATURATION: 97 % | HEART RATE: 81 BPM | TEMPERATURE: 98.4 F | WEIGHT: 212 LBS | RESPIRATION RATE: 16 BRPM | DIASTOLIC BLOOD PRESSURE: 82 MMHG | HEIGHT: 71 IN | SYSTOLIC BLOOD PRESSURE: 110 MMHG
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
## 2025-06-09 PROCEDURE — 72040 X-RAY EXAM NECK SPINE 2-3 VW: CPT

## 2025-06-09 PROCEDURE — 99284 EMERGENCY DEPT VISIT MOD MDM: CPT

## 2025-06-09 PROCEDURE — 90471 IMMUNIZATION ADMIN: CPT | Performed by: STUDENT IN AN ORGANIZED HEALTH CARE EDUCATION/TRAINING PROGRAM

## 2025-06-09 PROCEDURE — 6360000002 HC RX W HCPCS: Performed by: STUDENT IN AN ORGANIZED HEALTH CARE EDUCATION/TRAINING PROGRAM

## 2025-06-09 PROCEDURE — 90714 TD VACC NO PRESV 7 YRS+ IM: CPT | Performed by: STUDENT IN AN ORGANIZED HEALTH CARE EDUCATION/TRAINING PROGRAM

## 2025-06-09 PROCEDURE — 6370000000 HC RX 637 (ALT 250 FOR IP): Performed by: STUDENT IN AN ORGANIZED HEALTH CARE EDUCATION/TRAINING PROGRAM

## 2025-06-09 RX ORDER — CYCLOBENZAPRINE HCL 10 MG
10 TABLET ORAL 3 TIMES DAILY PRN
Qty: 21 TABLET | Refills: 0 | Status: SHIPPED | OUTPATIENT
Start: 2025-06-09 | End: 2025-06-19

## 2025-06-09 RX ORDER — ACETAMINOPHEN 500 MG
1000 TABLET ORAL
Status: COMPLETED | OUTPATIENT
Start: 2025-06-09 | End: 2025-06-09

## 2025-06-09 RX ADMIN — ACETAMINOPHEN 1000 MG: 500 TABLET ORAL at 18:01

## 2025-06-09 RX ADMIN — CLOSTRIDIUM TETANI TOXOID ANTIGEN (FORMALDEHYDE INACTIVATED) AND CORYNEBACTERIUM DIPHTHERIAE TOXOID ANTIGEN (FORMALDEHYDE INACTIVATED) 0.5 ML: 5; 2 INJECTION, SUSPENSION INTRAMUSCULAR at 18:02

## 2025-06-09 ASSESSMENT — ENCOUNTER SYMPTOMS
ABDOMINAL PAIN: 0
COUGH: 0
DIARRHEA: 0
SORE THROAT: 0
EYE PAIN: 0
NAUSEA: 0
BACK PAIN: 1
SHORTNESS OF BREATH: 0
VOMITING: 0

## 2025-06-09 ASSESSMENT — PAIN - FUNCTIONAL ASSESSMENT: PAIN_FUNCTIONAL_ASSESSMENT: 0-10

## 2025-06-09 ASSESSMENT — PAIN SCALES - GENERAL
PAINLEVEL_OUTOF10: 8
PAINLEVEL_OUTOF10: 7

## 2025-06-09 ASSESSMENT — PAIN DESCRIPTION - LOCATION
LOCATION: NECK
LOCATION: NECK

## 2025-06-09 ASSESSMENT — PAIN DESCRIPTION - ORIENTATION: ORIENTATION: POSTERIOR
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
## 2025-06-12 ENCOUNTER — OFFICE VISIT (OUTPATIENT)
Age: 45
End: 2025-06-12
Payer: COMMERCIAL

## 2025-06-12 VITALS
TEMPERATURE: 97.9 F | BODY MASS INDEX: 30.38 KG/M2 | HEIGHT: 71 IN | HEART RATE: 90 BPM | OXYGEN SATURATION: 96 % | DIASTOLIC BLOOD PRESSURE: 69 MMHG | RESPIRATION RATE: 18 BRPM | WEIGHT: 217 LBS | SYSTOLIC BLOOD PRESSURE: 111 MMHG

## 2025-06-12 DIAGNOSIS — M62.838 TRAPEZIUS MUSCLE SPASM: ICD-10-CM
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
## 2025-06-12 PROCEDURE — 99213 OFFICE O/P EST LOW 20 MIN: CPT
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
## 2025-06-13 ENCOUNTER — TELEPHONE (OUTPATIENT)
Age: 45
End: 2025-06-13

## 2025-06-13 NOTE — TELEPHONE ENCOUNTER
Hi Dr. Parker,    Patient is now being seen at [facility] Address: [address] [place] [address], [place] [address] Phone: [phone] please update referral with information.

## 2025-06-18 ENCOUNTER — TELEPHONE (OUTPATIENT)
Age: 45
End: 2025-06-18

## 2025-06-18 DIAGNOSIS — S39.012D STRAIN OF MUSCLE, FASCIA AND TENDON OF LOWER BACK, SUBSEQUENT ENCOUNTER: ICD-10-CM

## 2025-06-18 DIAGNOSIS — S16.1XXD STRAIN OF NECK MUSCLE, SUBSEQUENT ENCOUNTER: Primary | ICD-10-CM

## 2025-06-18 DIAGNOSIS — M25.512 ACUTE PAIN OF LEFT SHOULDER: ICD-10-CM

## 2025-06-18 NOTE — TELEPHONE ENCOUNTER
Chichi from [facility] requesting Dr. Parker to modify the original treatment order, to drill down in the areas they are targeting:    - Strain of muscle of the neck  - Strain of muscle of the lower back  - Pain in left shoulder    Please fax to [phone]

## 2025-08-12 ENCOUNTER — OFFICE VISIT (OUTPATIENT)
Age: 45
End: 2025-08-12
Payer: COMMERCIAL

## 2025-08-12 VITALS
BODY MASS INDEX: 30.69 KG/M2 | WEIGHT: 219.2 LBS | OXYGEN SATURATION: 95 % | HEART RATE: 79 BPM | TEMPERATURE: 98.4 F | SYSTOLIC BLOOD PRESSURE: 100 MMHG | DIASTOLIC BLOOD PRESSURE: 78 MMHG | HEIGHT: 71 IN | RESPIRATION RATE: 18 BRPM

## 2025-08-12 DIAGNOSIS — M25.812 IMPINGEMENT OF LEFT SHOULDER: Primary | ICD-10-CM

## 2025-08-12 PROCEDURE — 99213 OFFICE O/P EST LOW 20 MIN: CPT

## 2025-09-06 ENCOUNTER — TELEMEDICINE ON DEMAND (OUTPATIENT)
Age: 45
End: 2025-09-06
Payer: COMMERCIAL

## 2025-09-06 DIAGNOSIS — I25.10 CORONARY ARTERY DISEASE INVOLVING NATIVE CORONARY ARTERY OF NATIVE HEART WITHOUT ANGINA PECTORIS: ICD-10-CM

## 2025-09-06 PROCEDURE — 99213 OFFICE O/P EST LOW 20 MIN: CPT | Performed by: NURSE PRACTITIONER

## 2025-09-06 RX ORDER — CLOPIDOGREL BISULFATE 75 MG/1
75 TABLET ORAL DAILY
Qty: 30 TABLET | Refills: 0 | Status: SHIPPED | OUTPATIENT
Start: 2025-09-06

## 2025-09-06 ASSESSMENT — ENCOUNTER SYMPTOMS
BLOOD IN STOOL: 0
SHORTNESS OF BREATH: 0
ANAL BLEEDING: 0

## (undated) DEVICE — SYRINGE ANGIO 10ML RED FLAT GRP FIX M LUER CONN MEDALLION

## (undated) DEVICE — TR BAND RADIAL ARTERY COMPRESSION DEVICE: Brand: TR BAND

## (undated) DEVICE — PROCEDURE KIT FLUID MGMT CUST MAINFOLD STRL

## (undated) DEVICE — GLIDESHEATH SLENDER ACCESS KIT: Brand: GLIDESHEATH SLENDER

## (undated) DEVICE — SYR MED COLOR CODED 3ML RED -- MEDALLION

## (undated) DEVICE — ROSEN CURVED WIRE GUIDE: Brand: ROSEN

## (undated) DEVICE — SOLUTION IV 0.9% NACL IRRIGATION 1000ML PLASTIC POUR BOTTLE

## (undated) DEVICE — ANGIOGRAPHIC CATHETER: Brand: IMPULSE™

## (undated) DEVICE — CATH BLLN ANGIO 2.25X8MM NC EUPHORIA RX

## (undated) DEVICE — CATH DIAG D 5F PIG 155 110CM 5 -- IMPULSE 16391-42

## (undated) DEVICE — CATH DIAG WR5 EXPO 5FRX100CM -- EXPO

## (undated) DEVICE — HI-TORQUE BALANCE MIDDLEWEIGHT UNIVERSAL GUIDE WIRE .014 STRAIGHT TIP 3.0 CM X 190 CM: Brand: HI-TORQUE BALANCE MIDDLEWEIGHT UNIVERSAL

## (undated) DEVICE — CATHETER BLLN 142CM  SPRINTER LEGEND RX 2MM X 12MM GW

## (undated) DEVICE — RUNTHROUGH NS EXTRA FLOPPY PTCA GUIDEWIRE: Brand: RUNTHROUGH

## (undated) DEVICE — TUBING PRSS MON L6IN PVC M FEM CONN

## (undated) DEVICE — DEVICE INFL W/ HEM VLV TORQ

## (undated) DEVICE — PACK PROCEDURE SURG HRT CATH

## (undated) DEVICE — CATH GUID COR EB30 6FR 100CM -- LAUNCHER

## (undated) DEVICE — TUBING PRSS MON L60IN PVC RIG NONEXPANDING M TO FEM CONN